# Patient Record
Sex: FEMALE | Race: WHITE | NOT HISPANIC OR LATINO | Employment: FULL TIME | URBAN - METROPOLITAN AREA
[De-identification: names, ages, dates, MRNs, and addresses within clinical notes are randomized per-mention and may not be internally consistent; named-entity substitution may affect disease eponyms.]

---

## 2018-12-30 ENCOUNTER — HISTORICAL (OUTPATIENT)
Dept: EMERGENCY MEDICINE | Facility: HOSPITAL | Age: 23
End: 2018-12-30

## 2018-12-30 LAB
APPEARANCE, UA: CLEAR
B-HCG SERPL QL: NEGATIVE
BILIRUB UR QL STRIP: NEGATIVE
COLOR UR: YELLOW
FLUAV AG NPH QL IA: NEGATIVE
FLUBV AG NPH QL IA: NEGATIVE
GLUCOSE (UA): NEGATIVE
HGB UR QL STRIP: NEGATIVE
KETONES UR QL STRIP: NEGATIVE
LEUKOCYTE ESTERASE UR QL STRIP: NEGATIVE
NITRITE UR QL STRIP: NEGATIVE
PH UR STRIP: 6.5 [PH] (ref 5–7)
PROT UR QL STRIP: NEGATIVE
SP GR UR STRIP: 1.02 (ref 1–1.03)
UROBILINOGEN UR STRIP-ACNC: NEGATIVE

## 2019-01-02 LAB — FINAL CULTURE: NORMAL

## 2019-02-05 ENCOUNTER — HISTORICAL (OUTPATIENT)
Dept: RADIOLOGY | Facility: HOSPITAL | Age: 24
End: 2019-02-05

## 2020-10-22 ENCOUNTER — HISTORICAL (OUTPATIENT)
Dept: LAB | Facility: HOSPITAL | Age: 25
End: 2020-10-22

## 2020-10-22 LAB
C DIFF INTERP: NEGATIVE
HEMOCCULT SP1 STL QL: NEGATIVE

## 2020-10-26 LAB — FINAL CULTURE: NORMAL

## 2020-11-08 ENCOUNTER — HOSPITAL ENCOUNTER (OUTPATIENT)
Dept: MEDSURG UNIT | Facility: HOSPITAL | Age: 25
End: 2020-11-10
Attending: SURGERY | Admitting: SURGERY

## 2020-11-08 LAB
ABS NEUT (OLG): 6.11 X10(3)/MCL (ref 2.1–9.2)
ALBUMIN SERPL-MCNC: 3.8 GM/DL (ref 3.5–5)
ALBUMIN/GLOB SERPL: 1 RATIO (ref 1.1–2)
ALP SERPL-CCNC: 79 UNIT/L (ref 40–150)
ALT SERPL-CCNC: 13 UNIT/L (ref 0–55)
APPEARANCE, UA: CLEAR
AST SERPL-CCNC: 16 UNIT/L (ref 5–34)
B-HCG SERPL QL: NEGATIVE
BASOPHILS # BLD AUTO: 0.05 X10(3)/MCL (ref 0–0.2)
BASOPHILS NFR BLD AUTO: 0.4 % (ref 0–1)
BILIRUB SERPL-MCNC: 0.2 MG/DL (ref 0.2–1.2)
BILIRUB UR QL STRIP: NEGATIVE
BILIRUBIN DIRECT+TOT PNL SERPL-MCNC: <0.1 MG/DL (ref 0–0.5)
BILIRUBIN DIRECT+TOT PNL SERPL-MCNC: >0.1 MG/DL (ref 0–0.8)
BUN SERPL-MCNC: 14 MG/DL (ref 7–18.7)
CALCIUM SERPL-MCNC: 9 MG/DL (ref 8.4–10.2)
CHLORIDE SERPL-SCNC: 105 MMOL/L (ref 98–107)
CO2 SERPL-SCNC: 28 MMOL/L (ref 22–29)
COLOR UR: NORMAL
CREAT SERPL-MCNC: 1.55 MG/DL (ref 0.57–1.11)
EOSINOPHIL # BLD AUTO: 0.26 X10(3)/MCL (ref 0–0.9)
EOSINOPHIL NFR BLD AUTO: 2.2 % (ref 0–6.4)
ERYTHROCYTE [DISTWIDTH] IN BLOOD BY AUTOMATED COUNT: 12.3 % (ref 11.5–17)
GLOBULIN SER-MCNC: 4 GM/DL (ref 2.4–3.5)
GLUCOSE (UA): NEGATIVE
GLUCOSE SERPL-MCNC: 98 MG/DL (ref 74–100)
HCT VFR BLD AUTO: 44 % (ref 37–47)
HGB BLD-MCNC: 13.9 GM/DL (ref 12–16)
HGB UR QL STRIP: NEGATIVE
IMM GRANULOCYTES # BLD AUTO: 0.02 10*3/UL (ref 0–0.02)
IMM GRANULOCYTES NFR BLD AUTO: 0.2 % (ref 0–0.43)
KETONES UR QL STRIP: NEGATIVE
LEUKOCYTE ESTERASE UR QL STRIP: NEGATIVE
LIPASE SERPL-CCNC: 32 U/L
LYMPHOCYTES # BLD AUTO: 4.63 X10(3)/MCL (ref 0.6–4.6)
LYMPHOCYTES NFR BLD AUTO: 39.9 % (ref 16–44)
MCH RBC QN AUTO: 28.5 PG (ref 27–31)
MCHC RBC AUTO-ENTMCNC: 31.6 GM/DL (ref 33–36)
MCV RBC AUTO: 90.3 FL (ref 80–94)
MONOCYTES # BLD AUTO: 0.53 X10(3)/MCL (ref 0.1–1.3)
MONOCYTES NFR BLD AUTO: 4.6 % (ref 4–12.1)
NEUTROPHILS # BLD AUTO: 6.11 X10(3)/MCL (ref 2.1–9.2)
NEUTROPHILS NFR BLD AUTO: 52.7 % (ref 43–73)
NITRITE UR QL STRIP: NEGATIVE
NRBC BLD AUTO-RTO: 0 % (ref 0–0.2)
PH UR STRIP: 6.5 [PH] (ref 5–7)
PLATELET # BLD AUTO: 276 X10(3)/MCL (ref 130–400)
PMV BLD AUTO: 10.2 FL (ref 7.4–10.4)
POTASSIUM SERPL-SCNC: 3.9 MMOL/L (ref 3.5–5.1)
PROT SERPL-MCNC: 7.8 GM/DL (ref 6.4–8.3)
PROT UR QL STRIP: NEGATIVE
RBC # BLD AUTO: 4.87 X10(6)/MCL (ref 4.2–5.4)
SODIUM SERPL-SCNC: 142 MMOL/L (ref 136–145)
SP GR UR STRIP: 1.02 (ref 1–1.03)
UROBILINOGEN UR STRIP-ACNC: NEGATIVE
WBC # SPEC AUTO: 11.6 X10(3)/MCL (ref 4.5–11.5)

## 2020-11-09 LAB — LACTATE SERPL-SCNC: 0.8 MMOL/L (ref 0.5–2.2)

## 2020-11-10 LAB
ABS NEUT (OLG): 10.7 X10(3)/MCL (ref 2.1–9.2)
ALBUMIN SERPL-MCNC: 3.6 GM/DL (ref 3.5–5)
ALBUMIN/GLOB SERPL: 0.8 RATIO (ref 1.1–2)
ALP SERPL-CCNC: 70 UNIT/L (ref 40–150)
ALT SERPL-CCNC: 14 UNIT/L (ref 0–55)
AST SERPL-CCNC: 19 UNIT/L (ref 5–34)
BASOPHILS # BLD AUTO: 0 X10(3)/MCL (ref 0–0.2)
BASOPHILS NFR BLD AUTO: 0 %
BILIRUB SERPL-MCNC: 0.2 MG/DL
BILIRUBIN DIRECT+TOT PNL SERPL-MCNC: -0.3 MG/DL (ref 0–0.8)
BILIRUBIN DIRECT+TOT PNL SERPL-MCNC: 0.5 MG/DL (ref 0–0.5)
BUN SERPL-MCNC: 13.5 MG/DL (ref 7–18.7)
CALCIUM SERPL-MCNC: 9.2 MG/DL (ref 8.4–10.2)
CHLORIDE SERPL-SCNC: 103 MMOL/L (ref 98–107)
CO2 SERPL-SCNC: 27 MMOL/L (ref 22–29)
CREAT SERPL-MCNC: 1.23 MG/DL (ref 0.55–1.02)
EOSINOPHIL # BLD AUTO: 0 X10(3)/MCL (ref 0–0.9)
EOSINOPHIL NFR BLD AUTO: 0 %
ERYTHROCYTE [DISTWIDTH] IN BLOOD BY AUTOMATED COUNT: 12.4 % (ref 11.5–17)
GLOBULIN SER-MCNC: 4.4 GM/DL (ref 2.4–3.5)
GLUCOSE SERPL-MCNC: 132 MG/DL (ref 74–100)
HCT VFR BLD AUTO: 43.1 % (ref 37–47)
HGB BLD-MCNC: 13.1 GM/DL (ref 12–16)
LYMPHOCYTES # BLD AUTO: 2.2 X10(3)/MCL (ref 0.6–4.6)
LYMPHOCYTES NFR BLD AUTO: 16 %
MCH RBC QN AUTO: 28.3 PG (ref 27–31)
MCHC RBC AUTO-ENTMCNC: 30.4 GM/DL (ref 33–36)
MCV RBC AUTO: 93.1 FL (ref 80–94)
MONOCYTES # BLD AUTO: 0.6 X10(3)/MCL (ref 0.1–1.3)
MONOCYTES NFR BLD AUTO: 4 %
NEUTROPHILS # BLD AUTO: 10.7 X10(3)/MCL (ref 2.1–9.2)
NEUTROPHILS NFR BLD AUTO: 79 %
PLATELET # BLD AUTO: 282 X10(3)/MCL (ref 130–400)
PMV BLD AUTO: 10.6 FL (ref 9.4–12.4)
POTASSIUM SERPL-SCNC: 4.6 MMOL/L (ref 3.5–5.1)
PROT SERPL-MCNC: 8 GM/DL (ref 6.4–8.3)
RBC # BLD AUTO: 4.63 X10(6)/MCL (ref 4.2–5.4)
SODIUM SERPL-SCNC: 140 MMOL/L (ref 136–145)
WBC # SPEC AUTO: 13.5 X10(3)/MCL (ref 4.5–11.5)

## 2020-11-14 LAB
FINAL CULTURE: NORMAL
FINAL CULTURE: NORMAL

## 2022-04-06 ENCOUNTER — HISTORICAL (OUTPATIENT)
Dept: ADMINISTRATIVE | Facility: HOSPITAL | Age: 27
End: 2022-04-06

## 2022-04-08 ENCOUNTER — PATIENT OUTREACH (OUTPATIENT)
Dept: EMERGENCY MEDICINE | Facility: HOSPITAL | Age: 27
End: 2022-04-08
Payer: MEDICAID

## 2022-04-10 ENCOUNTER — HISTORICAL (OUTPATIENT)
Dept: ADMINISTRATIVE | Facility: HOSPITAL | Age: 27
End: 2022-04-10
Payer: MEDICAID

## 2022-04-29 VITALS — WEIGHT: 179.25 LBS | DIASTOLIC BLOOD PRESSURE: 65 MMHG | OXYGEN SATURATION: 100 % | SYSTOLIC BLOOD PRESSURE: 97 MMHG

## 2022-04-30 NOTE — ED PROVIDER NOTES
Patient:   Susan Banerjee             MRN: [id number]            FIN: [id number]               Age:   23 years     Sex:  Female     :  1995   Associated Diagnoses:   Viral syndrome   Author:   Leslie KOO, Colin QUINONEZ      Basic Information   Time seen: Date & time 2018 20:46:00.   History source: Patient.   Arrival mode: Private vehicle.   History limitation: None.   Additional information: Chief Complaint from Nursing Triage Note : Chief Complaint   2018 20:38 CST     Chief Complaint           Sore throat cough,fever,chills chest tightness since yesterday. vomited  x 3 since yesterday  .      History of Present Illness   The patient presents with nasal congestion, pnd, cough, body aches, sore throat and states vomited 3 times in 2 days and her appetite is poor.  States subjective fever.  Both of her children have been sick..  The onset was 1  days ago.  The course/duration of symptoms is fluctuating in intensity.  Character dry.  The degree at onset was minimal.  The degree at present is moderate.  There are exacerbating factors including allergen exposure and climate change.  The relieving factor is none.  Risk factors consist of none.  Prior episodes: none.  Therapy today: none.  Associated symptoms: sore throat, rhinorrhea and nasal congestion.  Additional history: none.        Review of Systems   Constitutional symptoms:  Negative except as documented in HPI.   Skin symptoms:  Negative except as documented in HPI.   Eye symptoms:  Negative except as documented in HPI.   ENMT symptoms:  Negative except as documented in HPI.   Respiratory symptoms:  Negative except as documented in HPI.   Cardiovascular symptoms:  Negative except as documented in HPI.   Gastrointestinal symptoms:  Negative except as documented in HPI.   Genitourinary symptoms:  Negative except as documented in HPI.   Musculoskeletal symptoms:  Negative except as documented in HPI.   Neurologic symptoms:  Negative except as  documented in HPI.   Psychiatric symptoms:  Negative except as documented in HPI.   Endocrine symptoms:  Negative except as documented in HPI.   Hematologic/Lymphatic symptoms:  Negative except as documented in HPI.   Allergy/immunologic symptoms:  Negative except as documented in HPI.             Additional review of systems information: All other systems reviewed and otherwise negative.      Health Status   Allergies:    Allergies (1) Active Reaction  No Known Allergies None Documented, no known allergies.   Medications:  (Selected)   Prescriptions  Prescribed  ZyrTEC-D 5 mg-120 mg oral tablet, extended release: 1 tab(s), Oral, BID, # 60 tab(s), 0 Refill(s)  albuterol 2.5 mg/3 mL (0.083%) inhalation solution: 2.5 mg = 3 mL, INH, q6hr, PRN PRN for wheezing, # 360 mL, 0 Refill(s)  dicyclomine 20 mg oral tablet: 20 mg = 1 tab(s), Oral, QID, # 28 tab(s), 0 Refill(s)  Documented Medications  Documented  ACETAMINOPHEN/COD #3 (300/30MG) TAB:   ACETAMINOPHEN/COD #4 (300/60MG) TAB: 1 tab(s), Oral, QID  BUSPIRONE 10MG TABLETS: 10 mg = 1 tab(s), Oral, TID  LORATADINE 10 MG TABS: 10 mg = 1 tab(s), Oral, Daily  WALESKA/POLY/HC 1% OTIC SOL BLUE (EAR): Ear-Right, QID  Proventil HFA 90 mcg/inh inhalation aerosol with adapter: 2 puff(s), INH, QID, PRN PRN for wheezing, 0 Refill(s)  TIZANIDINE 4MG TABLETS: 4 mg = 1 tab(s), Oral, q8hr, per nurse's notes.   Menstrual history: Per nurse's notes.      Past Medical/ Family/ Social History   Medical history:    Active  Asthma ([id number])  ADHD (attention deficit hyperactivity disorder) ([id number]), Reviewed as documented in chart.   Surgical history:    PE tubes., Reviewed as documented in chart.   Family history: Not significant.   Social history: Alcohol use: Denies, Tobacco use: Denies, Drug use: Denies.      Physical Examination               Vital Signs   Vital Signs   12/30/2018 20:38 CST     Temperature Oral          37.1 DegC                              Temperature Oral (calculated)             98.78 DegF                             Peripheral Pulse Rate     120 bpm  HI                             Respiratory Rate          20 br/min                             SpO2                      98 %                             Oxygen Therapy            Room air                             Systolic Blood Pressure   124 mmHg                             Diastolic Blood Pressure  67 mmHg  .   Measurements   12/30/2018 20:38 CST     Weight Dosing             76 kg                             Weight Measured and Calculated in Lbs     167.55 lb                             Weight Estimated          76 kg                             Height/Length Dosing      152 cm                             Height/Length Estimated   152 cm                             Body Mass Index Estimated 32.89 kg/m2  .   Basic Oxygen Information   12/30/2018 20:38 CST     SpO2                      98 %                             Oxygen Therapy            Room air  .   General:  Alert, no acute distress.    Skin:  Warm, dry, intact.    Head:  Normocephalic, atraumatic.    Neck:  Supple, trachea midline, no tenderness.    Eye:  Pupils are equal, round and reactive to light.   Ears, nose, mouth and throat:  Tympanic membrane: Bilateral, normal, External ear: Bilateral, canal, mastoid, pinna, normal, Nose: Bilateral nares, moderate, congestion, discharge, Mouth: Bilateral, normal, Throat: Bilateral, mild, pharynx, erythema.    Cardiovascular:  Regular rate and rhythm, No murmur.    Respiratory:  Lungs are clear to auscultation, respirations are non-labored, breath sounds are equal.    Chest wall:  No tenderness.   Musculoskeletal:  Normal ROM, normal strength.    Gastrointestinal:  Soft, Nontender, Normal bowel sounds.    Genitourinary:  No tenderness.   Neurological:  Alert and oriented to person, place, time, and situation, normal speech observed.    Psychiatric:  Cooperative,  appropriate mood & affect.       Medical Decision Making   Differential Diagnosis::  Upper respiratory infection, influenza, sinusitis, pharyngitis.    Results review:  Lab results : Lab View   12/30/2018 21:20 CST     U Beta hCG Ql             Negative                             UA Appear                 CLEAR                             UA Color                  YELLOW                             UA Spec Grav              1.025                             UA Bili                   Negative                             UA pH                     6.5                             UA Urobilinogen           Negative                             UA Blood                  Negative                             UA Glucose                Negative                             UA Ketones                Negative                             UA Protein                Negative                             UA Nitrite                Negative                             UA Leuk Est               Negative    12/30/2018 20:52 CST     Influ A Ag                Negative                             Influ B Ag                Negative                             Rapid Strept              Negative    .      Reexamination/ Reevaluation   Time: 12/30/2018 23:23:00 .   Course: improving.      Impression and Plan   Diagnosis   Viral syndrome ([id number] B34.9)   Plan   Condition: Unchanged.    Disposition: Medically cleared, Discharged: Time  12/30/2018 23:24:00, to home.    Prescriptions: Launch prescriptions   Pharmacy:  Zithromax Z-Abdiaziz 250 mg oral tablet (Prescribe): = 1 packet(s), Oral, As Directed, per package labeling, X 5 day(s), # 6 tab(s), 0 Refill(s)  prednisONE 50 mg oral tablet (Prescribe): 50 mg = 1 tab(s), Oral, Daily, # 3 tab(s), 0 Refill(s)  albuterol 2.5 mg/3 mL (0.083%) inhalation solution (Prescribe): 2.5 mg = 3 mL, INH, q4hr, PRN PRN as needed for wheezing, # 25 EA, 0 Refill(s)  Ventolin HFA 90 mcg/inh inhalation aerosol  (Prescribe): 2 puff(s), INH, q6hr, PRN PRN as needed for wheezing/congestion, # 1 EA, 0 Refill(s)  Zofran ODT 4 mg oral tablet, disintegrating (Prescribe): 4 mg = 1 tab(s), Oral, q8hr, PRN PRN nausea, # 15 tab(s), 0 Refill(s)  .    Patient was given the following educational materials: Viral Illness, Adult.    Follow up with: Follow up with your family doctor of choice in the next 3-5 days; Report to ED if symptoms return / worsen.    Counseled: Patient, Regarding diagnosis, Regarding diagnostic results, Regarding treatment plan, Regarding prescription, Patient indicated understanding of instructions.
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
## 2022-04-30 NOTE — OP NOTE
DATE OF SURGERY:        SURGEON:  Yao Kaur Jr., MD    RESIDENT SURGEON:  Dr. Dhruv Francois, PGY-2    PREOPERATIVE DIAGNOSIS:  Acute appendicitis.    POSTOPERATIVE DIAGNOSIS:  Acute appendicitis.    PROCEDURE:  Laparoscopic appendectomy.    ANESTHESIA:  General endotracheal.    COMPLICATIONS:  None.    CONDITION:  Stable.    ESTIMATED BLOOD LOSS:  10 cc.    SPECIMEN:  Appendix.    FINDINGS:  Early acute appendicitis.    PROCEDURE IN DETAIL:  After appropriate consents were obtained, the patient was brought back to the operative suite, placed in supine position, placed under general endotracheal anesthesia.     Next, the abdomen was prepped and draped in the usual sterile fashion.  At this time, a time-out was done to make sure we had the appropriate patient, the appropriate operation, the appropriate preoperative medications.  Next, a supraumbilical incision was made with an 11 blade, and a 5 mm optical trocar was used to enter the abdomen.  Once into the abdomen, pneumoperitoneum was created.     Next, a left lower quadrant 12 mm optical port was placed under direct visualization with no complications.  Next, a suprapubic 5 mm port was placed under direct visualization with no complications.  We then isolated the right colon and followed the tinea down to the base of the appendix, and it was actively inflamed through the midportion of the appendix all the way to the tip.  At this point, we then grasped the appendix, made a window with the [place] dissector through the mesoappendix near the base of the appendix and the cecum.  Once this was done, we then came across the base of the appendix with the laparoscopic [place] stapler blue load 55 mm.  Then we came across the mesoappendix with a white load.  There was a small ooze of blood from the mesoappendix staple line.  This was stopped with a hemoclip placed right on the spot of small oozing, and this made the staple line hemostatic.  We then suctioned  out any fluid from the pelvis and suctioned out any blood around the cecum.       Next, we then placed the appendix in the laparoscopic EndoCatch bag and then removed the appendix out of the abdomen through the left lower quadrant incision and then passed it off as specimen.  Next, we then closed the left lower quadrant fascial incision with a laparoscopic suture passer with a 0 Vicryl and then closed all port sites with interrupted 4-0 Vicryl and Dermabond.       At case end, all counts were correct.  Patient tolerated procedure well, was ultimately transferred back to the floor in stable condition.        ______________________________  MD AZALEA Collazo Jr./  DD:  11/09/2020  Time:  10:59AM  DT:  11/09/2020  Time:  11:17AM  Job #:  [id number]

## 2022-05-09 ENCOUNTER — PATIENT OUTREACH (OUTPATIENT)
Dept: EMERGENCY MEDICINE | Facility: HOSPITAL | Age: 27
End: 2022-05-09
Payer: MEDICAID

## 2022-06-14 ENCOUNTER — OFFICE VISIT (OUTPATIENT)
Dept: GYNECOLOGY | Facility: CLINIC | Age: 27
End: 2022-06-14
Payer: MEDICAID

## 2022-06-14 VITALS
HEIGHT: 59 IN | WEIGHT: 168.81 LBS | HEART RATE: 77 BPM | DIASTOLIC BLOOD PRESSURE: 72 MMHG | OXYGEN SATURATION: 98 % | RESPIRATION RATE: 18 BRPM | BODY MASS INDEX: 34.03 KG/M2 | TEMPERATURE: 98 F | SYSTOLIC BLOOD PRESSURE: 109 MMHG

## 2022-06-14 DIAGNOSIS — Z30.431 INTRAUTERINE DEVICE SURVEILLANCE: ICD-10-CM

## 2022-06-14 DIAGNOSIS — Z01.419 ENCOUNTER FOR ANNUAL ROUTINE GYNECOLOGICAL EXAMINATION: Primary | ICD-10-CM

## 2022-06-14 DIAGNOSIS — N89.8 VAGINAL DISCHARGE: ICD-10-CM

## 2022-06-14 LAB
CLUE CELLS VAG QL WET PREP: ABNORMAL
T VAGINALIS VAG QL WET PREP: ABNORMAL
WBC #/AREA VAG WET PREP: ABNORMAL
YEAST SPEC QL WET PREP: ABNORMAL

## 2022-06-14 PROCEDURE — 99214 OFFICE O/P EST MOD 30 MIN: CPT | Mod: PBBFAC | Performed by: NURSE PRACTITIONER

## 2022-06-14 PROCEDURE — 1160F PR REVIEW ALL MEDS BY PRESCRIBER/CLIN PHARMACIST DOCUMENTED: ICD-10-PCS | Mod: CPTII,,, | Performed by: NURSE PRACTITIONER

## 2022-06-14 PROCEDURE — 3078F PR MOST RECENT DIASTOLIC BLOOD PRESSURE < 80 MM HG: ICD-10-PCS | Mod: CPTII,,, | Performed by: NURSE PRACTITIONER

## 2022-06-14 PROCEDURE — 99395 PREV VISIT EST AGE 18-39: CPT | Mod: S$PBB,,, | Performed by: NURSE PRACTITIONER

## 2022-06-14 PROCEDURE — 3008F PR BODY MASS INDEX (BMI) DOCUMENTED: ICD-10-PCS | Mod: CPTII,,, | Performed by: NURSE PRACTITIONER

## 2022-06-14 PROCEDURE — 1159F MED LIST DOCD IN RCRD: CPT | Mod: CPTII,,, | Performed by: NURSE PRACTITIONER

## 2022-06-14 PROCEDURE — 3074F PR MOST RECENT SYSTOLIC BLOOD PRESSURE < 130 MM HG: ICD-10-PCS | Mod: CPTII,,, | Performed by: NURSE PRACTITIONER

## 2022-06-14 PROCEDURE — 3078F DIAST BP <80 MM HG: CPT | Mod: CPTII,,, | Performed by: NURSE PRACTITIONER

## 2022-06-14 PROCEDURE — 87210 SMEAR WET MOUNT SALINE/INK: CPT | Performed by: NURSE PRACTITIONER

## 2022-06-14 PROCEDURE — 1160F RVW MEDS BY RX/DR IN RCRD: CPT | Mod: CPTII,,, | Performed by: NURSE PRACTITIONER

## 2022-06-14 PROCEDURE — 99395 PR PREVENTIVE VISIT,EST,18-39: ICD-10-PCS | Mod: S$PBB,,, | Performed by: NURSE PRACTITIONER

## 2022-06-14 PROCEDURE — 3008F BODY MASS INDEX DOCD: CPT | Mod: CPTII,,, | Performed by: NURSE PRACTITIONER

## 2022-06-14 PROCEDURE — 3074F SYST BP LT 130 MM HG: CPT | Mod: CPTII,,, | Performed by: NURSE PRACTITIONER

## 2022-06-14 PROCEDURE — 1159F PR MEDICATION LIST DOCUMENTED IN MEDICAL RECORD: ICD-10-PCS | Mod: CPTII,,, | Performed by: NURSE PRACTITIONER

## 2022-06-14 RX ORDER — ALBUTEROL SULFATE 90 UG/1
AEROSOL, METERED RESPIRATORY (INHALATION)
COMMUNITY
Start: 2022-01-13 | End: 2023-02-06

## 2022-06-14 RX ORDER — BUDESONIDE 0.25 MG/2ML
0.25 INHALANT ORAL DAILY
COMMUNITY
End: 2023-02-06

## 2022-06-14 RX ORDER — LEVONORGESTREL 52 MG/1
INTRAUTERINE DEVICE INTRAUTERINE
COMMUNITY
End: 2023-02-06

## 2022-06-14 NOTE — PROGRESS NOTES
"  Subjective:       Patient ID: Susan Banerjee is a 27 y.o. female.    Chief Complaint:  Well Woman    History of Present Illness  The patient  here for annual exam. Her LMP was 22. Period last 3-4 days and usually just spotting when wiping, cycles have restarted since 2022 she was previously amenorrheic. Mirena IUD placed in 2017 for contraception. At last visit, IUD strings was not visible. Pt was to have IUD removed in  per request with TL, however cancelled appt. Pt requesting to have IUD removed d/t pain and pressure with intercourse and at other times, not relieved with Tylenol as she was advised not to take Ibuprofen. Pt states partner can feel tip of IUD during intercourse. Denies history of abnormal paps. Last pap -NIL and HPV neg. Denies breast or urinary complaints. Denies abnormal bleeding or discharge. Pt reports no STIs in the past and no concerns. HPV vaccinated. Denies tobacco use. Dep. screening 0. Denies fly hx of breast, ovarian, uterine or colon cancer.     GYN & OB History  Patient's last menstrual period was 2022 (exact date).   Date of Last Pap: -NIL    Review of patient's allergies indicates:  No Known Allergies  History reviewed. No pertinent past medical history.  OB History    Para Term  AB Living   2 2           SAB IAB Ectopic Multiple Live Births                  # Outcome Date GA Lbr Jordan/2nd Weight Sex Delivery Anes PTL Lv   2 Para            1 Para                 Review of Systems  Review of Systems    Negative except for pertinent findings for positives per HPI     Objective:    Physical Exam    /72 (BP Location: Left arm, Patient Position: Sitting, BP Method: Medium (Automatic))   Pulse 77   Temp 98.2 °F (36.8 °C)   Resp 18   Ht 4' 11" (1.499 m)   Wt 76.6 kg (168 lb 12.8 oz)   LMP 2022 (Exact Date)   SpO2 98%   BMI 34.09 kg/m²   GENERAL: Well-developed female in no acute distress.  SKIN: Normal to " inspection, warm and intact.  BREASTS: No masses, lumps, discharge, tenderness.  ABDOMEN: Soft, non tender.  VULVA: General appearance normal; external genitalia with no lesions or erythema.  BLADDER: No tenderness.  VAGINA: Mucosa normal, pink, thin white discharge.  CERVIX: Grossly normal with erythema. Distal portion of IUD visible from os, pt experienced pain with swab and speculum exam.  BIMANUAL EXAM: reveals a 10 week-sized uterus. The uterus is regular and tender. Collins adnexa reveal no evidence of masses, tenderness.  PSYCHIATRIC: Patient is oriented to person, place, and time. Mood and affect are normal.    Assessment:       1. Encounter for annual routine gynecological examination    2. Intrauterine device surveillance    3. Vaginal discharge       Plan:   Susan was seen today for well woman.    Diagnoses and all orders for this visit:    Encounter for annual routine gynecological examination    Intrauterine device surveillance    Vaginal discharge  -     Wet Prep, Genital  -     Chlamydia/GC, PCR    Pelvic today. Pap utd per ACOG.  Distal portion of IUD visible from os, pt experiencing pain with speculum exam. Scheduled with GYN for removal on 6/15/22  Wet prep and g/c  Follow up in about 1 year (around 6/14/2023) for annual exam.

## 2022-06-15 ENCOUNTER — TELEPHONE (OUTPATIENT)
Dept: GYNECOLOGY | Facility: CLINIC | Age: 27
End: 2022-06-15
Payer: MEDICAID

## 2022-06-15 ENCOUNTER — OFFICE VISIT (OUTPATIENT)
Dept: GYNECOLOGY | Facility: CLINIC | Age: 27
End: 2022-06-15
Payer: MEDICAID

## 2022-06-15 ENCOUNTER — PATIENT OUTREACH (OUTPATIENT)
Dept: EMERGENCY MEDICINE | Facility: HOSPITAL | Age: 27
End: 2022-06-15
Payer: MEDICAID

## 2022-06-15 VITALS
BODY MASS INDEX: 33.87 KG/M2 | RESPIRATION RATE: 20 BRPM | OXYGEN SATURATION: 99 % | WEIGHT: 168 LBS | HEART RATE: 93 BPM | DIASTOLIC BLOOD PRESSURE: 83 MMHG | HEIGHT: 59 IN | TEMPERATURE: 98 F | SYSTOLIC BLOOD PRESSURE: 135 MMHG

## 2022-06-15 DIAGNOSIS — R10.2 CHRONIC PELVIC PAIN IN FEMALE: ICD-10-CM

## 2022-06-15 DIAGNOSIS — M79.18 MYALGIA OF PELVIC FLOOR: ICD-10-CM

## 2022-06-15 DIAGNOSIS — Z20.2 TRICHOMONAS EXPOSURE: Primary | ICD-10-CM

## 2022-06-15 DIAGNOSIS — Z30.432 ENCOUNTER FOR IUD REMOVAL: Primary | ICD-10-CM

## 2022-06-15 DIAGNOSIS — G89.29 CHRONIC PELVIC PAIN IN FEMALE: ICD-10-CM

## 2022-06-15 DIAGNOSIS — Z30.011 ENCOUNTER FOR INITIAL PRESCRIPTION OF CONTRACEPTIVE PILLS: ICD-10-CM

## 2022-06-15 PROCEDURE — 58301 REMOVE INTRAUTERINE DEVICE: CPT | Mod: PBBFAC | Performed by: STUDENT IN AN ORGANIZED HEALTH CARE EDUCATION/TRAINING PROGRAM

## 2022-06-15 PROCEDURE — 99214 OFFICE O/P EST MOD 30 MIN: CPT | Mod: PBBFAC

## 2022-06-15 RX ORDER — NORGESTIMATE AND ETHINYL ESTRADIOL 0.25-0.035
1 KIT ORAL DAILY
Qty: 28 TABLET | Refills: 12 | Status: SHIPPED | OUTPATIENT
Start: 2022-06-15 | End: 2023-02-06

## 2022-06-15 RX ORDER — METRONIDAZOLE 500 MG/1
500 TABLET ORAL 2 TIMES DAILY
Qty: 14 TABLET | Refills: 0 | Status: SHIPPED | OUTPATIENT
Start: 2022-06-15 | End: 2022-06-22

## 2022-06-15 NOTE — ASSESSMENT & PLAN NOTE
Contraception counseling on all options. Patient opts for COCPs. No medical contraindications. Has h/o migraines, but no aura. Rx for sprintec sent to pharmacy for contraception management.

## 2022-06-15 NOTE — TELEPHONE ENCOUNTER
Swab shows trichomonas which is an STD and needs to be treated. Other labs are pending. Rx sent to pharmacy on file. Pt and partner need treatment and refrain from intercourse for at least 7 days after they are both treated. If partner does not get treated and they engage in unprotected intercourse, will pass trichomonas on again. Encourage safe sex practices. No alcohol for 72 hours after completion of medication.

## 2022-06-15 NOTE — ASSESSMENT & PLAN NOTE
Worsening pelvic pain over the last few months with significant pain following speculum exam yesterday with NP. On exam bilateral tenderness on piriformis muscles. Referral for pelvic floor PT.

## 2022-06-15 NOTE — PROGRESS NOTES
Identity verified.  Pt denies SOB, urinary symptoms.  BM 6/15/22.  Pt had a GYN check up 6/14/22 and c/o pelvic pain with IUD.  Appt made for 6/15/22 to remove IUD.  STI testing completed and Flagyl prescription called to pharmacy today.  Pt states GYN clinic contacted her about the prescription.  Instructed pt to eat before taking medication and not to drink ETOH because it would make her violently ill.  Pt had GYN appt 6/15/22 and IUD was removed, birth control pills were ordered and referral to physical therapy for pelvic floor therapy.  Pt states she is waiting return call from GYN for a prescription for the pelvic pain.  Pt has not seen PCP since 2021.  Instructed to contact PCP to make an appt for a wellness exam.  Pt states she has not been to the dentist in the last year.  Offered to mail dental provider list, pt accepted.  Stressed the importance of medication compliance, keeping appointments and reinforced the use of urgent care clinic for non emergent issues when PCP unavailable.  Patient verbalized understanding to all instructions.   1109 Dental provider list and right care right time education mailed to pt.    Appointments   Follow-Up Appt Scheduled :   No   Follow-Up Appointment Status :     Pending Results :     PCP Visit Upcoming :   No   PCP Visit Within Year :   Yes   PCP Visit Upcoming Reason :   Regular visit   Follow-Up Specialist Appt Scheduled :   Yes   Type of Specialist :     GYN 6/16/2023  Follow-Up Lab Appt Scheduled :   No   Follow-Up Date :    CDT   Follow-Up Radiology Appt Scheduled :   No     Providers Patient Visited Last Year :     DOMINIK Posadas, FNP (pt. unsure of date but states it was in the last 6 months)  Dr. Daniel Barnard, ANP  Dr. Elissa Malave

## 2022-06-15 NOTE — PROGRESS NOTES
[facility] GYNECOLOGY CLINIC NOTE     Susan Banerjee is a 27 y.o.  presenting to GYN clinic for removal of malpositioned IUD. Patient reports worsening in bleeding and pain since March. Yesterday had annual exam with NP and was noted to see IUD post in cervical os. She reports she has had significant pelvic pain over the last few months not related to cramping. She feels like her pelvis has been tightening worse since speculum exam yesterday. IUD was placed in  for contraception and patient would like to be placed on COCPs for contraception.       Gynecology  Last Pap  NIL/HPV Neg    OB History        2    Para   2    Term                AB        Living           SAB        IAB        Ectopic        Multiple        Live Births                    Past Medical History:   Diagnosis Date    Complication of anesthesia     Migraine headache without aura       Past Surgical History:   Procedure Laterality Date    APPENDECTOMY      KNEE SURGERY        Current Outpatient Medications   Medication Instructions    albuterol (PROVENTIL/VENTOLIN HFA) 90 mcg/actuation inhaler ProAir HFA 90 mcg/actuation aerosol inhaler   Inhale 2 puff(s) every 4 hours by inhalation route as needed.    budesonide (PULMICORT) 0.25 mg, Nebulization, Daily, Controller    levonorgestreL (MIRENA) 20 mcg/24 hours (7 yrs) 52 mg IUD Mirena 20 mcg/24 hours (7 yrs) 52 mg intrauterine device   Take by intrauterine route.    metroNIDAZOLE (FLAGYL) 500 mg, Oral, 2 times daily, Do not drink alcohol during treatment and for 3 days after completion.    norgestimate-ethinyl estradioL (SPRINTEC, 28,) 0.25-35 mg-mcg per tablet 1 tablet, Oral, Daily     Social History     Tobacco Use    Smoking status: Never Smoker    Smokeless tobacco: Never Used   Substance Use Topics    Alcohol use: Never    Drug use: Never       Review of Systems  Pertinent items are noted in HPI.     Objective:     /83   Pulse 93   Temp 98  "°F (36.7 °C)   Resp 20   Ht 4' 11" (1.499 m)   Wt 76.2 kg (168 lb)   LMP 2022 (Exact Date)   SpO2 99%   BMI 33.93 kg/m²   Physical Exam:  Gen: Well-nourished, well-developed female appearing stated age. Alert, cooperative, in no acute distress.  External genitalia: Normal female genetalia without lesion, discharge or tenderness. I  Speculum Exam: Vaginal vault without discharge, nonodorous, no lesions/masses seen.  Cervical os visualized as closed, no lesions/masses. IUD post visible within cervical os  Bimanual Exam: No cervical motion tenderness.  Uterus freely mobile.  Normal size uterus. No adnexal masses.  Bilateral piriformis tenderness noted.   Note: RN chaperone present for entirety of exam.        Assessment:       27 y.o.  here for:   1. Encounter for IUD removal     2. Myalgia of pelvic floor  Ambulatory referral/consult to Physical/Occupational Therapy   3. Chronic pelvic pain in female     4. Encounter for initial prescription of contraceptive pills            Plan:         Problem List Items Addressed This Visit        Renal/    Encounter for IUD removal - Primary     IUD removed without difficulty.            Myalgia of pelvic floor     Worsening pelvic pain over the last few months with significant pain following speculum exam yesterday with NP. On exam bilateral tenderness on piriformis muscles. Referral for pelvic floor PT.            Relevant Orders    Ambulatory referral/consult to Physical/Occupational Therapy    Encounter for initial prescription of contraceptive pills     Contraception counseling on all options. Patient opts for COCPs. No medical contraindications. Has h/o migraines, but no aura. Rx for sprintec sent to pharmacy for contraception management.              GI    Chronic pelvic pain in female           Return to clinic for telemedicine in 6 mos to discuss pelvic pain    Discussed patient and plan with Dr. Provost Elissa Colon MD  [employer] [facility] PGY-4        "

## 2022-06-16 ENCOUNTER — TELEPHONE (OUTPATIENT)
Dept: GYNECOLOGY | Facility: CLINIC | Age: 27
End: 2022-06-16
Payer: MEDICAID

## 2022-06-16 ENCOUNTER — TELEPHONE (OUTPATIENT)
Dept: EMERGENCY MEDICINE | Facility: HOSPITAL | Age: 27
End: 2022-06-16
Payer: MEDICAID

## 2022-06-16 ENCOUNTER — PATIENT OUTREACH (OUTPATIENT)
Dept: EMERGENCY MEDICINE | Facility: HOSPITAL | Age: 27
End: 2022-06-16
Payer: MEDICAID

## 2022-06-16 NOTE — TELEPHONE ENCOUNTER
Patient called to state she is having a lot of abdominal pain and cramping.  She had an IUD removed 6/15/22.  She states she is unable to sleep or get comfortable.  Instructed to present to an urgent care clinic for evaluation.  Please follow up with the patient.    Thank you,     Eloise Casillas LPN Care Coordinator  [phone]

## 2022-06-16 NOTE — PROGRESS NOTES
Patient called to state she is having a lot of abdominal pain and cramping.  She had an IUD removed 6/15/22.  She states she is unable to sleep or get comfortable.  Instructed to present to an urgent care clinic for evaluation.  Message sent to [facility].

## 2022-06-16 NOTE — TELEPHONE ENCOUNTER
Patient c/o pelvic pain, had IUD removed yesterday, sending PT order today.  Instructed patient to rotate ibuprofen or tylenol or may take together.  Warm baths, heating pad may help after taking med.  [facility]

## 2022-07-22 ENCOUNTER — HOSPITAL ENCOUNTER (EMERGENCY)
Facility: HOSPITAL | Age: 27
Discharge: HOME | End: 2022-07-22
Attending: INTERNAL MEDICINE
Payer: MEDICAID

## 2022-07-22 VITALS
RESPIRATION RATE: 18 BRPM | SYSTOLIC BLOOD PRESSURE: 131 MMHG | DIASTOLIC BLOOD PRESSURE: 78 MMHG | BODY MASS INDEX: 32.79 KG/M2 | OXYGEN SATURATION: 98 % | TEMPERATURE: 98 F | WEIGHT: 167 LBS | HEART RATE: 81 BPM | HEIGHT: 60 IN

## 2022-07-22 DIAGNOSIS — R11.0 NAUSEA: ICD-10-CM

## 2022-07-22 DIAGNOSIS — F41.9 ANXIETY: ICD-10-CM

## 2022-07-22 DIAGNOSIS — R06.02 SHORTNESS OF BREATH: ICD-10-CM

## 2022-07-22 DIAGNOSIS — R10.13 EPIGASTRIC ABDOMINAL PAIN: Primary | ICD-10-CM

## 2022-07-22 LAB
ALBUMIN SERPL-MCNC: 3.3 GM/DL (ref 3.5–5)
ALBUMIN/GLOB SERPL: 1.1 RATIO (ref 1.1–2)
ALP SERPL-CCNC: 54 UNIT/L (ref 40–150)
ALT SERPL-CCNC: 16 UNIT/L (ref 0–55)
AMPHET UR QL SCN: NEGATIVE
AMYLASE SERPL-CCNC: 34 UNIT/L (ref 25–125)
APPEARANCE UR: ABNORMAL
AST SERPL-CCNC: 11 UNIT/L (ref 5–34)
B-HCG SERPL QL: NEGATIVE
BACTERIA #/AREA URNS AUTO: ABNORMAL /HPF
BARBITURATE SCN PRESENT UR: NEGATIVE
BASOPHILS # BLD AUTO: 0.04 X10(3)/MCL (ref 0–0.2)
BASOPHILS NFR BLD AUTO: 0.6 %
BENZODIAZ UR QL SCN: NEGATIVE
BILIRUB UR QL STRIP.AUTO: NEGATIVE MG/DL
BILIRUBIN DIRECT+TOT PNL SERPL-MCNC: 0.5 MG/DL
BUN SERPL-MCNC: 18 MG/DL (ref 7–18.7)
CALCIUM SERPL-MCNC: 8.8 MG/DL (ref 8.4–10.2)
CANNABINOIDS UR QL SCN: NEGATIVE
CHLORIDE SERPL-SCNC: 111 MMOL/L (ref 98–107)
CO2 SERPL-SCNC: 21 MMOL/L (ref 22–29)
COCAINE UR QL SCN: NEGATIVE
COLOR UR AUTO: YELLOW
CREAT SERPL-MCNC: 0.8 MG/DL (ref 0.55–1.02)
EOSINOPHIL # BLD AUTO: 0.22 X10(3)/MCL (ref 0–0.9)
EOSINOPHIL NFR BLD AUTO: 3.2 %
ERYTHROCYTE [DISTWIDTH] IN BLOOD BY AUTOMATED COUNT: 13.2 % (ref 11.5–17)
FENTANYL UR QL SCN: NEGATIVE
GLOBULIN SER-MCNC: 3.1 GM/DL (ref 2.4–3.5)
GLUCOSE SERPL-MCNC: 90 MG/DL (ref 74–100)
GLUCOSE UR QL STRIP.AUTO: NEGATIVE MG/DL
HCT VFR BLD AUTO: 38.8 % (ref 37–47)
HGB BLD-MCNC: 12.2 GM/DL (ref 12–16)
IMM GRANULOCYTES # BLD AUTO: 0.02 X10(3)/MCL (ref 0–0.04)
IMM GRANULOCYTES NFR BLD AUTO: 0.3 %
KETONES UR QL STRIP.AUTO: NEGATIVE MG/DL
LEUKOCYTE ESTERASE UR QL STRIP.AUTO: NEGATIVE UNIT/L
LIPASE SERPL-CCNC: 32 U/L
LYMPHOCYTES # BLD AUTO: 3.52 X10(3)/MCL (ref 0.6–4.6)
LYMPHOCYTES NFR BLD AUTO: 50.6 %
MCH RBC QN AUTO: 28.4 PG (ref 27–31)
MCHC RBC AUTO-ENTMCNC: 31.4 MG/DL (ref 33–36)
MCV RBC AUTO: 90.2 FL (ref 80–94)
MDMA UR QL SCN: NEGATIVE
MONOCYTES # BLD AUTO: 0.36 X10(3)/MCL (ref 0.1–1.3)
MONOCYTES NFR BLD AUTO: 5.2 %
NEUTROPHILS # BLD AUTO: 2.8 X10(3)/MCL (ref 2.1–9.2)
NEUTROPHILS NFR BLD AUTO: 40.1 %
NITRITE UR QL STRIP.AUTO: NEGATIVE
OPIATES UR QL SCN: NEGATIVE
PCP UR QL: NEGATIVE
PH UR STRIP.AUTO: 6 [PH]
PH UR: 6 [PH] (ref 3–11)
PLATELET # BLD AUTO: 194 X10(3)/MCL (ref 130–400)
PMV BLD AUTO: 10.7 FL (ref 7.4–10.4)
POTASSIUM SERPL-SCNC: 3.6 MMOL/L (ref 3.5–5.1)
PROT SERPL-MCNC: 6.4 GM/DL (ref 6.4–8.3)
PROT UR QL STRIP.AUTO: NEGATIVE MG/DL
RBC # BLD AUTO: 4.3 X10(6)/MCL (ref 4.2–5.4)
RBC #/AREA URNS AUTO: ABNORMAL /HPF
RBC UR QL AUTO: ABNORMAL UNIT/L
SODIUM SERPL-SCNC: 142 MMOL/L (ref 136–145)
SP GR UR STRIP.AUTO: 1.02
SPECIFIC GRAVITY, URINE AUTO (.000) (OHS): 1.02 (ref 1–1.03)
SQUAMOUS #/AREA URNS AUTO: ABNORMAL /HPF
UROBILINOGEN UR STRIP-ACNC: 0.2 MG/DL
WBC # SPEC AUTO: 7 X10(3)/MCL (ref 4.5–11.5)
WBC #/AREA URNS AUTO: ABNORMAL /HPF

## 2022-07-22 PROCEDURE — 85025 COMPLETE CBC W/AUTO DIFF WBC: CPT | Performed by: INTERNAL MEDICINE

## 2022-07-22 PROCEDURE — 81001 URINALYSIS AUTO W/SCOPE: CPT | Mod: 59 | Performed by: INTERNAL MEDICINE

## 2022-07-22 PROCEDURE — 63600175 PHARM REV CODE 636 W HCPCS: Performed by: INTERNAL MEDICINE

## 2022-07-22 PROCEDURE — 81025 URINE PREGNANCY TEST: CPT | Performed by: INTERNAL MEDICINE

## 2022-07-22 PROCEDURE — 96374 THER/PROPH/DIAG INJ IV PUSH: CPT

## 2022-07-22 PROCEDURE — 83690 ASSAY OF LIPASE: CPT | Performed by: INTERNAL MEDICINE

## 2022-07-22 PROCEDURE — 82150 ASSAY OF AMYLASE: CPT | Performed by: INTERNAL MEDICINE

## 2022-07-22 PROCEDURE — 96375 TX/PRO/DX INJ NEW DRUG ADDON: CPT

## 2022-07-22 PROCEDURE — 36415 COLL VENOUS BLD VENIPUNCTURE: CPT | Performed by: INTERNAL MEDICINE

## 2022-07-22 PROCEDURE — 99284 EMERGENCY DEPT VISIT MOD MDM: CPT | Mod: 25

## 2022-07-22 PROCEDURE — 80307 DRUG TEST PRSMV CHEM ANLYZR: CPT | Performed by: INTERNAL MEDICINE

## 2022-07-22 PROCEDURE — 25000003 PHARM REV CODE 250: Performed by: INTERNAL MEDICINE

## 2022-07-22 PROCEDURE — C9113 INJ PANTOPRAZOLE SODIUM, VIA: HCPCS | Performed by: INTERNAL MEDICINE

## 2022-07-22 PROCEDURE — 80053 COMPREHEN METABOLIC PANEL: CPT | Performed by: INTERNAL MEDICINE

## 2022-07-22 RX ORDER — PANTOPRAZOLE SODIUM 40 MG/10ML
40 INJECTION, POWDER, LYOPHILIZED, FOR SOLUTION INTRAVENOUS
Status: COMPLETED | OUTPATIENT
Start: 2022-07-22 | End: 2022-07-22

## 2022-07-22 RX ORDER — MAG HYDROX/ALUMINUM HYD/SIMETH 200-200-20
30 SUSPENSION, ORAL (FINAL DOSE FORM) ORAL ONCE
Status: COMPLETED | OUTPATIENT
Start: 2022-07-22 | End: 2022-07-22

## 2022-07-22 RX ORDER — PROCHLORPERAZINE EDISYLATE 5 MG/ML
5 INJECTION INTRAMUSCULAR; INTRAVENOUS
Status: COMPLETED | OUTPATIENT
Start: 2022-07-22 | End: 2022-07-22

## 2022-07-22 RX ORDER — LIDOCAINE HYDROCHLORIDE 20 MG/ML
10 SOLUTION OROPHARYNGEAL ONCE
Status: COMPLETED | OUTPATIENT
Start: 2022-07-22 | End: 2022-07-22

## 2022-07-22 RX ORDER — DICYCLOMINE HYDROCHLORIDE 20 MG/1
20 TABLET ORAL 4 TIMES DAILY PRN
Qty: 40 TABLET | Refills: 0 | Status: SHIPPED | OUTPATIENT
Start: 2022-07-22 | End: 2022-08-01

## 2022-07-22 RX ADMIN — PANTOPRAZOLE SODIUM 40 MG: 40 INJECTION, POWDER, LYOPHILIZED, FOR SOLUTION INTRAVENOUS at 07:07

## 2022-07-22 RX ADMIN — ALUMINUM HYDROXIDE, MAGNESIUM HYDROXIDE, AND SIMETHICONE 30 ML: 200; 200; 20 SUSPENSION ORAL at 07:07

## 2022-07-22 RX ADMIN — PROCHLORPERAZINE EDISYLATE 5 MG: 5 INJECTION INTRAMUSCULAR; INTRAVENOUS at 08:07

## 2022-07-22 RX ADMIN — LIDOCAINE HYDROCHLORIDE 10 ML: 20 SOLUTION ORAL at 07:07

## 2022-07-23 NOTE — ED PROVIDER NOTES
Encounter Date: 7/22/2022       History     Chief Complaint   Patient presents with    Abdominal Pain     Pt c/o epigastric pain since 3am. Pain worse with deep inspirations. Pt states currently taking medications for step throat and gastritis.     27-year-old white female presents to the emergency department with epigastric pain all day long.  She states that she feels like she can not take a deep breath because when she does it causes pain in her mid abdomen.  She was recently seen and was given medications for strep throat and gastritis by urgent care.  Then she was seen by primary given Carafate on top of that but still presents to the emergency department complaining of epigastric pain.  She denies tobacco        Review of patient's allergies indicates:  No Known Allergies  Past Medical History:   Diagnosis Date    Complication of anesthesia     Migraine headache without aura      Past Surgical History:   Procedure Laterality Date    APPENDECTOMY      KNEE SURGERY       Family History   Problem Relation Age of Onset    Lung cancer Mother      Social History     Tobacco Use    Smoking status: Never Smoker    Smokeless tobacco: Never Used   Substance Use Topics    Alcohol use: Never    Drug use: Never     Review of Systems   Constitutional: Negative.  Negative for activity change, appetite change, chills, diaphoresis, fatigue, fever and unexpected weight change.   HENT: Negative.  Negative for congestion, dental problem, drooling, ear discharge, ear pain, facial swelling, hearing loss, mouth sores, nosebleeds, postnasal drip, rhinorrhea, sinus pressure, sinus pain, sneezing, sore throat, tinnitus, trouble swallowing and voice change.    Eyes: Negative.  Negative for photophobia, pain, discharge, redness, itching and visual disturbance.   Respiratory: Negative.  Negative for apnea, cough, choking, chest tightness, shortness of breath, wheezing and stridor.    Cardiovascular: Negative.  Negative for chest  pain, palpitations and leg swelling.   Gastrointestinal: Positive for abdominal pain and nausea. Negative for abdominal distention, anal bleeding, blood in stool, constipation, diarrhea, rectal pain and vomiting.   Endocrine: Negative.  Negative for cold intolerance, heat intolerance, polydipsia, polyphagia and polyuria.   Genitourinary: Negative.  Negative for decreased urine volume, difficulty urinating, dyspareunia, dysuria, enuresis, flank pain, frequency, genital sores, hematuria, menstrual problem, pelvic pain, urgency, vaginal bleeding, vaginal discharge and vaginal pain.   Musculoskeletal: Negative.  Negative for arthralgias, back pain, gait problem, joint swelling, myalgias, neck pain and neck stiffness.   Skin: Negative.  Negative for color change, pallor, rash and wound.   Allergic/Immunologic: Negative.  Negative for environmental allergies, food allergies and immunocompromised state.   Neurological: Negative.  Negative for dizziness, tremors, seizures, syncope, facial asymmetry, speech difficulty, weakness, light-headedness, numbness and headaches.   Hematological: Negative.  Negative for adenopathy. Does not bruise/bleed easily.   Psychiatric/Behavioral: Negative for agitation, behavioral problems, confusion, decreased concentration, dysphoric mood, hallucinations, self-injury, sleep disturbance and suicidal ideas. The patient is nervous/anxious. The patient is not hyperactive.    All other systems reviewed and are negative.      Physical Exam     Initial Vitals   BP Pulse Resp Temp SpO2   07/22/22 1807 07/22/22 1804 07/22/22 1804 07/22/22 1804 07/22/22 1804   114/70 66 20 98.4 °F (36.9 °C) 100 %      MAP       --                Physical Exam    Nursing note and vitals reviewed.  Constitutional: She appears well-developed and well-nourished. She is not diaphoretic. No distress.   HENT:   Head: Normocephalic and atraumatic.   Mouth/Throat: Oropharynx is clear and moist. No oropharyngeal exudate.   Eyes:  Conjunctivae and EOM are normal. Pupils are equal, round, and reactive to light. No scleral icterus.   Neck: Neck supple. No JVD present.   Normal range of motion.  Cardiovascular: Normal rate, regular rhythm, normal heart sounds and intact distal pulses. Exam reveals no gallop and no friction rub.    No murmur heard.  Pulmonary/Chest: Breath sounds normal. No respiratory distress. She has no wheezes. She exhibits no tenderness.   Abdominal: Abdomen is soft. Bowel sounds are normal. She exhibits no distension. There is no abdominal tenderness.   Mild mid epigastric tenderness upon palpation no rebound no guarding no signs of acute abdomen There is no rebound.   Musculoskeletal:         General: Normal range of motion.      Cervical back: Normal range of motion and neck supple.     Neurological: She is alert and oriented to person, place, and time. She has normal strength and normal reflexes.   Skin: Skin is warm and dry. Capillary refill takes less than 2 seconds.   Psychiatric: She has a normal mood and affect. Thought content normal.   Anxious         ED Course   Procedures   Admission on 07/22/2022   Component Date Value Ref Range Status    Sodium Level 07/22/2022 142  136 - 145 mmol/L Final    Potassium Level 07/22/2022 3.6  3.5 - 5.1 mmol/L Final    Chloride 07/22/2022 111 (A) 98 - 107 mmol/L Final    Carbon Dioxide 07/22/2022 21 (A) 22 - 29 mmol/L Final    Glucose Level 07/22/2022 90  74 - 100 mg/dL Final    Blood Urea Nitrogen 07/22/2022 18.0  7.0 - 18.7 mg/dL Final    Creatinine 07/22/2022 0.80  0.55 - 1.02 mg/dL Final    Calcium Level Total 07/22/2022 8.8  8.4 - 10.2 mg/dL Final    Protein Total 07/22/2022 6.4  6.4 - 8.3 gm/dL Final    Albumin Level 07/22/2022 3.3 (A) 3.5 - 5.0 gm/dL Final    Globulin 07/22/2022 3.1  2.4 - 3.5 gm/dL Final    Albumin/Globulin Ratio 07/22/2022 1.1  1.1 - 2.0 ratio Final    Bilirubin Total 07/22/2022 0.5  <=1.5 mg/dL Final    Alkaline Phosphatase 07/22/2022 54   40 - 150 unit/L Final    Alanine Aminotransferase 07/22/2022 16  0 - 55 unit/L Final    Aspartate Aminotransferase 07/22/2022 11  5 - 34 unit/L Final    Estimated GFR-Non  07/22/2022 >60  mls/min/1.73/m2 Final    Beta hCG Qualitative, Urine 07/22/2022 Negative  Negative Final    Color, UA 07/22/2022 Yellow  Yellow, Colorless, Other, Clear Final    Appearance, UA 07/22/2022 Slightly Cloudy (A) Clear Final    Specific Gravity, UA 07/22/2022 1.020   Final    pH, UA 07/22/2022 6.0  5.0, 5.5, 6.0, 6.5, 7.0, 7.5, 8.0, 8.5 Final    Protein, UA 07/22/2022 Negative  Negative, 300  mg/dL Final    Glucose, UA 07/22/2022 Negative  Negative, Normal mg/dL Final    Ketones, UA 07/22/2022 Negative  Negative, +1, +2, +3, +4, +5, >=160, >=80 mg/dL Final    Blood, UA 07/22/2022 Large (A) Negative unit/L Final    Bilirubin, UA 07/22/2022 Negative  Negative mg/dL Final    Urobilinogen, UA 07/22/2022 0.2  0.2, 1.0, Normal mg/dL Final    Nitrites, UA 07/22/2022 Negative  Negative Final    Leukocyte Esterase, UA 07/22/2022 Negative  Negative, 75  unit/L Final    Amylase Level 07/22/2022 34  25 - 125 unit/L Final    Lipase Level 07/22/2022 32  <=60 U/L Final    WBC 07/22/2022 7.0  4.5 - 11.5 x10(3)/mcL Final    RBC 07/22/2022 4.30  4.20 - 5.40 x10(6)/mcL Final    Hgb 07/22/2022 12.2  12.0 - 16.0 gm/dL Final    Hct 07/22/2022 38.8  37.0 - 47.0 % Final    MCV 07/22/2022 90.2  80.0 - 94.0 fL Final    MCH 07/22/2022 28.4  27.0 - 31.0 pg Final    MCHC 07/22/2022 31.4 (A) 33.0 - 36.0 mg/dL Final    RDW 07/22/2022 13.2  11.5 - 17.0 % Final    Platelet 07/22/2022 194  130 - 400 x10(3)/mcL Final    MPV 07/22/2022 10.7 (A) 7.4 - 10.4 fL Final    Neut % 07/22/2022 40.1  % Final    Lymph % 07/22/2022 50.6  % Final    Mono % 07/22/2022 5.2  % Final    Eos % 07/22/2022 3.2  % Final    Basophil % 07/22/2022 0.6  % Final    Lymph # 07/22/2022 3.52  0.6 - 4.6 x10(3)/mcL Final    Neut # 07/22/2022 2.8  2.1 -  9.2 x10(3)/mcL Final    Mono # 07/22/2022 0.36  0.1 - 1.3 x10(3)/mcL Final    Eos # 07/22/2022 0.22  0 - 0.9 x10(3)/mcL Final    Baso # 07/22/2022 0.04  0 - 0.2 x10(3)/mcL Final    IG# 07/22/2022 0.02  0 - 0.04 x10(3)/mcL Final    IG% 07/22/2022 0.3  % Final    Amphetamines, Urine 07/22/2022 Negative  Negative Final    Barbituates, Urine 07/22/2022 Negative  Negative Final    Benzodiazepine, Urine 07/22/2022 Negative  Negative Final    Cannabinoids, Urine 07/22/2022 Negative  Negative Final    Cocaine, Urine 07/22/2022 Negative  Negative Final    Fentanyl, Urine 07/22/2022 Negative  Negative Final    MDMA, Urine 07/22/2022 Negative  Negative Final    Opiates, Urine 07/22/2022 Negative  Negative Final    Phencyclidine, Urine 07/22/2022 Negative  Negative Final    pH, Urine 07/22/2022 6.0  3.0 - 11.0 Final    Specific Gravity, Urine Auto 07/22/2022 1.020  1.001 - 1.035 Final    Bacteria, UA 07/22/2022 None Seen  None Seen, Rare, Occasional /HPF Final    RBC, UA 07/22/2022 50-99 (A) None Seen, 0-2, 3-5, 0-5 /HPF Final    WBC, UA 07/22/2022 0-2  None Seen, 0-2, 3-5, 0-5 /HPF Final    Squamous Epithelial Cells, UA 07/22/2022 Few (A) None Seen, Rare, Occasional, Occ /HPF Final   In regards to the red blood cells in her urine she admits to being on her menstrual cycle    Labs Reviewed   COMPREHENSIVE METABOLIC PANEL - Abnormal; Notable for the following components:       Result Value    Chloride 111 (*)     Carbon Dioxide 21 (*)     Albumin Level 3.3 (*)     All other components within normal limits   URINALYSIS, REFLEX TO URINE CULTURE - Abnormal; Notable for the following components:    Appearance, UA Slightly Cloudy (*)     Blood, UA Large (*)     All other components within normal limits   CBC WITH DIFFERENTIAL - Abnormal; Notable for the following components:    MCHC 31.4 (*)     MPV 10.7 (*)     All other components within normal limits   URINALYSIS, MICROSCOPIC - Abnormal; Notable for the  following components:    RBC, UA 50-99 (*)     Squamous Epithelial Cells, UA Few (*)     All other components within normal limits   PREGNANCY TEST, URINE RAPID - Normal   AMYLASE - Normal   LIPASE - Normal   DRUG SCREEN, URINE (BEAKER) - Normal    Narrative:     Cut off concentrations:    Amphetamines - 1000 ng/ml  Barbiturates - 200 ng/ml  Benzodiazepine - 200 ng/ml  Cannabinoids (THC) - 50 ng/ml  Cocaine - 300 ng/ml  Fentanyl - 1.0 ng/ml  MDMA - 500 ng/ml  Opiates - 300 ng/ml   Phencyclidine (PCP) - 25 ng/ml    Specimen submitted for drug analysis and tested for pH and specific gravity in order to evaluate sample integrity. Suspect tampering if specific gravity is <1.003 and/or pH is not within the range of 4.5 - 8.0  False negatives may result form substances such as bleach added to urine.  False positives may result for the presence of a substance with similar chemical structure to the drug or its metabolite.    This test provides only a PRELIMINARY analytical test result. A more specific alternate chemical method must be used in order to obtain a confirmed analytical result. Gas chromatography/mass spectrometry (GC/MS) is the preferred confirmatory method. Other chemical confirmation methods are available. Clinical consideration and professional judgement should be applied to any drug of abuse test result, particularly when preliminary positive results are used.    Positive results will be confirmed only at the physicians request. Unconfirmed screening results are to be used only for medical purposes (treatment).        CBC W/ AUTO DIFFERENTIAL    Narrative:     The following orders were created for panel order CBC auto differential.  Procedure                               Abnormality         Status                     ---------                               -----------         ------                     CBC with Differential[[id number]]        Abnormal            Final result                 Please view  results for these tests on the individual orders.          Imaging Results          X-Ray Chest PA And Lateral (Preliminary result)  Result time 07/22/22 21:33:05    ED Interpretation by Jacob Wharton MD (07/22/22 21:29:49, [facility] - Emergency Dept, Emergency Medicine)    No acute cardiopulmonary changes noted, no signs of free air under the diaphragm to indicate gastric perforation                               Medications   aluminum-magnesium hydroxide-simethicone 200-200-20 mg/5 mL suspension 30 mL (30 mLs Oral Given 7/22/22 1930)     And   LIDOcaine HCl 2% oral solution 10 mL (10 mLs Oral Given 7/22/22 1930)   pantoprazole injection 40 mg (40 mg Intravenous Given 7/22/22 1930)   prochlorperazine injection Soln 5 mg (5 mg Intravenous Given 7/22/22 2045)                 ED Course as of 07/22/22 2133 Fri Jul 22, 2022 2115 Patient did feel a little better relief after the GI cocktail but was still complaining of severe epigastric pain and diaphragm irritation so I decided to give her small dose of Compazine and tried a get some relaxation of her anxiety and to help treat some of her pain.  This worked perfect [PL]      ED Course User Index  [PL] Jacob Wharton MD             Clinical Impression:   Final diagnoses:  [R06.02] Shortness of breath  [R10.13] Epigastric abdominal pain (Primary)  [R11.0] Nausea  [F41.9] Anxiety          ED Disposition Condition    Discharge Stable        ED Prescriptions     Medication Sig Dispense Start Date End Date Auth. Provider    dicyclomine (BENTYL) 20 mg tablet Take 1 tablet (20 mg total) by mouth 4 (four) times daily as needed (Abdominal pain and cramping). 40 tablet 7/22/2022 8/1/2022 Jacob Wharton MD        Follow-up Information     Follow up With Specialties Details Why Contact Info    Dory Parsons NP Family Medicine In 1 week  PO   [place] [address]  [phone]          Karma Marmolejo is a certified MA and was present during the  entire interaction with this patient     Jacob Wharton MD  07/22/22 7239

## 2022-07-25 ENCOUNTER — PATIENT OUTREACH (OUTPATIENT)
Dept: EMERGENCY MEDICINE | Facility: HOSPITAL | Age: 27
End: 2022-07-25
Payer: MEDICAID

## 2022-07-25 NOTE — PROGRESS NOTES
Original encounter date 4/8/2022 in [employer] EMR.  Information placed in Epic for continuity purposes.  Initial Assessment                   [facility] Entered On:  4/8/2022 10:32 CDT    Performed On:  4/8/2022 10:23 CDT by Eloise Casillas               Discharge Past 30 Days   Visit to the Hospital in the Last 30 Days :   Emergency department (ED) visit   Reason for Choosing ED for Care :   Convenience   Perception of Change in Health Status DC :   Improving   Discharged To :   Home independently   DC Instructions :   Received discharge instructions , Understands discharge instructions    Education Provided :   ED utilization, Importance of keeping appointments, Medication Compliance, Other: Benefits of having a PCP and eating a healthy diet   Discharge Past 30 Days Addntl Comments :   Pt. states the left knee pain has improved and she has minimal swelling.  Instructed to complete medrol dose pack.  Pt. denies SOB.  Pt. states she needs a mouth piece for her nebulizer and is waiting PCP to provide prescription.  Pt. last pap smear 1/19/2021.  Pt. verbalized understanding to all instructions.     Patient pregnant :   No   Eloise Casillas - 4/8/2022 10:23 CDT   Barriers to Care   SDoH Eat Less Than You Should :   No   SDoH Shut Off Services to Your Home :   No   SDoH No Regular Place to Live :   No   SDoH Needed Provider but Costs too Much :   Yes   (Comment: Pt. sees pain management and has to pay out of pocket. [Eloise Casillas - 4/8/2022 10:23 CDT] )   SDoH Help Reading and Writing Paper Work :   No   SDoH Feel Lonely Often :   No   SDoH Missed Appt/Meds- No Transportation :   No   SDoH Call Dr To Be Seen Right Away :   No   SDoH Medical Problems Cause ED Visit :   Yes   (Comment: Chronic right knee pain [Eloise Casillas - 4/8/2022 10:23 CDT] )   SDoH Other Problems Affecting Health :   No   SDoH Reviewed :   Yes   SDoH Dentist Seen in Last Year :   Yes   Eloise Casillas - 4/8/2022 10:23 CDT   Prescriptions    Medication Reconcilation Completed :   Unknown   Medication Prescriptions Filled After DC :   Confirms all medications filled , Confirms taking medications as prescribed    Questions About Meds Prescribed at DC :   Denies any questions or concerns                    Eloise Casillas - 4/8/2022 10:23 CDT   Appointments   Follow-Up Appt Scheduled :   No   Follow-Up Appointment Status :   Has not had opportunity to call for appointment   Pending Results :   PCP office closed on Fridays   PCP Visit Upcoming :   No   PCP Visit Within Year :   Yes   PCP Visit Upcoming Reason :   Regular visit   Follow-Up Specialist Appt Scheduled :   Yes   Type of Specialist :   GYN   Follow-Up Lab Appt Scheduled :   No   Follow-Up Date :   6/4/2022 CDT   Follow-Up Radiology Appt Scheduled :   No   Eloise Casillas - 4/8/2022 10:23 CDT   Navigation   Initial Assesment Completed By :   Phone   ED FIN :   [id number]   Transportation Arrangements Made :   Yes   Providers Patient Visited Last Year :   DOMINIK Posadas, LUBA (pt. unsure of date but states it was in the last 6 months)  Dr. Daniel Pierre   Root Causes for High-ED Utilization :   Chronic conditions, Lack of access to care   Plan: :   Educated on appropriate ED utilization, Educated on alternate means of health care; ie urgent care when PCP not available, Educated on importance of follow up care with PCP, Educated on importance of follow up preventative dental care with dentist, Budget-friendly health eating education given, Other: Medication compliance   Participation in Activity Designed to Address Lack of Annual Ambulatory or Preventative Care Visit? :   Yes   Participation in Activity Designed to Address Avoidable ED Utilization? :   Yes   During Current Measurement Year, Did Enrollee Receive Education Regarding Outpatient Primary Care Options? :   Yes   During Current Measurement Year, Did Enrollee Receive an Appt Reminder 24-48 Hours Before a Scheduled Appt? :   Yes    During Current Measurement Year, Did the Network Provider Schedule and Appt or Provide a Referral to Enrollee? :   Yes   Education Provided :   Verbal   Eloise Casillas - 4/8/2022 10:23 CDT   Durable Medical Equipment   Do You Have Current DME at Home? :   Yes   Anticipated Home Equipment :   Nebulizer   DME Frequency of Use :   Daily   DME Duration of Use :   Life time   DME Supplies: :   Needs new supplies   DME Supplies Needed :   Mouth piece waiting for PCP to provide prescription   Eloise Casillas - 4/8/2022 10:23 CDT

## 2022-07-27 NOTE — PROGRESS NOTES
Attempted to reach patient 3 times for MCIP Navigation without success.  Encounter closed.    [id number] Patient returned call and encounter re-opened.    Identity verified.  Pt states she is feeling better.  She states she has no questions about prescriptions or discharge instructions from the ED.  Pt states she has not called PCP for an appt.  Instructed patient on the importance of having PCP visits to keep her well.  Pt denies SOB and states she has seen some improvement with the pelvic floor physical therapy.  Encouraged pt to eat a healthy diet and drink lots of water.  Pt denies jahaira SDOH barriers.  Stressed the importance of medication compliance, keeping appointments and reinforced the use of urgent care clinic for non emergent issues when PCP unavailable.  Patient verbalized understanding to all instructions.     Follow up 8/24/2022    Appointments   Follow-Up Appt Scheduled :   No   Follow-Up Appointment Status :   Has not had opportunity to call for appointment   Pending Results :     PCP Visit Upcoming :   No   PCP Visit Within Year :   Yes   PCP Visit Upcoming Reason :   Regular visit   Follow-Up Specialist Appt Scheduled :   Yes   Type of Specialist :    Physical Therapy 8/1/2022   GYN 6/16/2023  Follow-Up Lab Appt Scheduled :   No   Follow-Up Date :    CDT   Follow-Up Radiology Appt Scheduled :   No     Providers Patient Visited Last Year :     DOMINIK Posadas, MAYCOP (pt. unsure of date but states it was in the last 6 months)  Dr. Daniel Barnard, ANP  Dr. Elissa Morales  [facility]

## 2022-08-24 ENCOUNTER — PATIENT OUTREACH (OUTPATIENT)
Dept: EMERGENCY MEDICINE | Facility: HOSPITAL | Age: 27
End: 2022-08-24
Payer: MEDICAID

## 2022-08-24 NOTE — PROGRESS NOTES
"Identity verified.  Pt states she is doing well and denies SOB.  She states she had a PCP appt. 7/29/2022 with no changes in plan of care.  Pt states she has a follow up appt in a "few" months.  Pt states she has completed her pelvic floor physical therapy and is doing well.  Pt has not been to the dentist in the last year.  Stressed the importance of dental cleanings every 6 months.  Pt states she will call [facility] and make an appt.  Pt denies any SDOH barriers.  Stressed the importance of medication compliance, eating a healthy diet, drinking plenty of water, keeping appointments and reinforced the use of urgent care clinic for non emergent issues when PCP unavailable.  Patient verbalized understanding to all instructions.     Follow up 9/21/2022      Appointments   Follow-Up Appt Scheduled :   No   Follow-Up Appointment Status :     Pending Results :     PCP Visit Upcoming :   No   PCP Visit Upcoming Date  PCP Visit Upcoming Reason :     PCP Visit Within Year :   Yes   PCP Visit Within Year Date: 7/29/2022  PCP Visit Within Year Reason :   Regular visit   Follow-Up Specialist Appt Scheduled :   Yes   Type of Specialist :    GYN 6/16/2023  Follow-Up Lab Appt Scheduled :   No   Follow-Up Date :    CDT   Follow-Up Radiology Appt Scheduled :   No      Providers Patient Visited Last Year :     DOMINIK Posadas, FNP   Dr. Daniel Barnard, ANP  Dr. Elissa Morales  [facility]    "

## 2022-09-21 ENCOUNTER — PATIENT OUTREACH (OUTPATIENT)
Dept: EMERGENCY MEDICINE | Facility: HOSPITAL | Age: 27
End: 2022-09-21
Payer: MEDICAID

## 2022-12-02 ENCOUNTER — HOSPITAL ENCOUNTER (EMERGENCY)
Facility: HOSPITAL | Age: 27
Discharge: HOME | End: 2022-12-02
Attending: EMERGENCY MEDICINE
Payer: MEDICAID

## 2022-12-02 VITALS
SYSTOLIC BLOOD PRESSURE: 133 MMHG | BODY MASS INDEX: 30.24 KG/M2 | WEIGHT: 150 LBS | DIASTOLIC BLOOD PRESSURE: 83 MMHG | OXYGEN SATURATION: 99 % | HEIGHT: 59 IN | HEART RATE: 93 BPM | RESPIRATION RATE: 20 BRPM | TEMPERATURE: 98 F

## 2022-12-02 DIAGNOSIS — S83.91XA SPRAIN OF RIGHT KNEE, UNSPECIFIED LIGAMENT, INITIAL ENCOUNTER: ICD-10-CM

## 2022-12-02 DIAGNOSIS — S80.01XA CONTUSION OF RIGHT KNEE, INITIAL ENCOUNTER: Primary | ICD-10-CM

## 2022-12-02 DIAGNOSIS — M25.561 RIGHT KNEE PAIN: ICD-10-CM

## 2022-12-02 PROBLEM — J45.909 ASTHMA: Status: ACTIVE | Noted: 2022-12-02

## 2022-12-02 PROBLEM — F41.9 ANXIETY: Status: ACTIVE | Noted: 2019-09-16

## 2022-12-02 PROBLEM — F90.9 ATTENTION DEFICIT HYPERACTIVITY DISORDER (ADHD): Status: ACTIVE | Noted: 2022-12-02

## 2022-12-02 PROCEDURE — 96372 THER/PROPH/DIAG INJ SC/IM: CPT

## 2022-12-02 PROCEDURE — 63600175 PHARM REV CODE 636 W HCPCS

## 2022-12-02 PROCEDURE — 99284 EMERGENCY DEPT VISIT MOD MDM: CPT | Mod: 25

## 2022-12-02 RX ORDER — KETOROLAC TROMETHAMINE 30 MG/ML
60 INJECTION, SOLUTION INTRAMUSCULAR; INTRAVENOUS
Status: COMPLETED | OUTPATIENT
Start: 2022-12-02 | End: 2022-12-02

## 2022-12-02 RX ORDER — DICLOFENAC SODIUM 50 MG/1
50 TABLET, DELAYED RELEASE ORAL 2 TIMES DAILY PRN
Qty: 30 TABLET | Refills: 0 | Status: SHIPPED | OUTPATIENT
Start: 2022-12-02 | End: 2023-02-06

## 2022-12-02 RX ORDER — HYDROCODONE BITARTRATE AND ACETAMINOPHEN 5; 325 MG/1; MG/1
1 TABLET ORAL EVERY 6 HOURS PRN
Qty: 12 TABLET | Refills: 0 | Status: SHIPPED | OUTPATIENT
Start: 2022-12-02 | End: 2022-12-05

## 2022-12-02 RX ADMIN — KETOROLAC TROMETHAMINE 60 MG: 30 INJECTION, SOLUTION INTRAMUSCULAR at 08:12

## 2022-12-02 NOTE — Clinical Note
"Susan Bautista" Chriss was seen and treated in our emergency department on 12/2/2022.  She may return to work on 12/05/2022.  Released to light duty, ortho to release to full duty.      If you have any questions or concerns, please don't hesitate to call.      Jaleesa MEJIA    "

## 2022-12-03 NOTE — DISCHARGE INSTRUCTIONS
Thanks for letting us take care of you today!  It is our goal to give you courteous care and to keep you comfortable and informed, if you have any questions before you leave I will be happy to try and answer them.    Here is some advice after your visit:      Your visit in the emergency department is NOT definitive care - please follow-up with your primary care doctor and/or specialist within 1-2 days.  Please return if you have any worsening in your condition or if you have any other concerns.    If you had radiology exams like an XRAY or CT in the emergency Department the interpreation on them may be preliminary - there may be less time sensitive findings on the reports please obtain these reports within 24 hours from the hospital or by using your out on your mobile phone to access records.  Bring these to your primary care doctor and/or specialist for further review of incidental findings.    Please review any LAB WORK from your visit today with your primary care physician.    If you were prescribed OPIATE PAIN MEDICATION - please understand of these medications can be addictive, you may fill less of the prescription was written for, you do not have to take the full prescription.  You may discard what you do not use.  Please seek help if you feel you are having problems with addiction.  Do not drive or operate heavy machinery if you are taking sedating medications.  Do not mix these medications with alcohol.      If you had a SPLINT placed in the emergency department if you have severe pain numbness tingling or discoloration of year digits please remove the splint and return to the emergency department for further evaluation as this may represent a sign of compromise to the nerves or blood vessels due to swelling.

## 2022-12-03 NOTE — ED PROVIDER NOTES
Encounter Date: 12/2/2022       History     Chief Complaint   Patient presents with    Knee Pain     Patient is a 27 year old female who presents to ER with right knee pain after slipping on water and falling in a restaurant. She denies hitting head or neck. She denies numbness or tingling to extremities. Patient reports surgery to her right knee 3 years ago. She has not been able to ambulated on the leg since the fall. She is able to fully extend the leg but report pain when attempting to flex right knee.     The history is provided by the patient. No  was used.   Knee Pain  This is a new problem. The current episode started 1 to 2 hours ago. The problem has not changed since onset.Pertinent negatives include no chest pain, no abdominal pain, no headaches and no shortness of breath. The symptoms are aggravated by bending, exertion and walking. Nothing relieves the symptoms. She has tried rest for the symptoms. The treatment provided no relief.   Review of patient's allergies indicates:  No Known Allergies  Past Medical History:   Diagnosis Date    Complication of anesthesia     Migraine headache without aura      Past Surgical History:   Procedure Laterality Date    APPENDECTOMY      KNEE SURGERY       Family History   Problem Relation Age of Onset    Lung cancer Mother      Social History     Tobacco Use    Smoking status: Never    Smokeless tobacco: Never   Substance Use Topics    Alcohol use: Never    Drug use: Never     Review of Systems   Constitutional:  Negative for fever.   HENT:  Negative for sore throat.    Respiratory:  Negative for shortness of breath.    Cardiovascular:  Negative for chest pain.   Gastrointestinal:  Negative for abdominal pain and nausea.   Genitourinary:  Negative for dysuria.   Musculoskeletal:  Positive for arthralgias (Right knee pain) and joint swelling. Negative for back pain and neck pain.   Skin:  Negative for rash.   Neurological:  Negative for dizziness,  weakness and headaches.   Hematological:  Does not bruise/bleed easily.   Psychiatric/Behavioral:  Negative for behavioral problems.    All other systems reviewed and are negative.    Physical Exam     Initial Vitals   BP Pulse Resp Temp SpO2   12/02/22 1923 12/02/22 1923 12/02/22 1923 12/02/22 2000 12/02/22 1923   133/83 93 20 98.4 °F (36.9 °C) 99 %      MAP       --                Physical Exam    Nursing note and vitals reviewed.  Constitutional: Vital signs are normal. She appears well-developed and well-nourished.   HENT:   Head: Normocephalic.   Right Ear: Hearing and tympanic membrane normal.   Left Ear: Hearing and tympanic membrane normal.   Mouth/Throat: Uvula is midline, oropharynx is clear and moist and mucous membranes are normal.   Eyes: Conjunctivae are normal. Pupils are equal, round, and reactive to light.   Neck:   Normal range of motion.   Full passive range of motion without pain.     Cardiovascular:  Regular rhythm, normal heart sounds and normal pulses.           Pulmonary/Chest: Effort normal and breath sounds normal.   Musculoskeletal:      Cervical back: Normal, full passive range of motion without pain and normal range of motion. No muscular tenderness.      Thoracic back: Normal.      Lumbar back: Normal.      Right knee: Swelling and bony tenderness (Generalized) present. Decreased range of motion. ACL laxity (Mild) present. Normal pulse.      Left knee: Normal. Normal pulse.      Right lower leg: Normal.      Left lower leg: Normal.      Comments: All other adjacent joints normal      Neurological: She is alert. GCS eye subscore is 4. GCS verbal subscore is 5. GCS motor subscore is 6.   Skin: Skin is warm, dry and intact. Capillary refill takes less than 2 seconds.       ED Course   Procedures  Labs Reviewed - No data to display       Imaging Results              X-Ray Knee Complete 4 Or More Views Right (Final result)  Result time 12/02/22 20:08:35      Final result by Jose Batista,  MD (12/02/22 20:08:35)                   Impression:      No acute fractures are demonstrated.      Electronically signed by: Jose Batista MD  Date:    12/02/2022  Time:    20:08               Narrative:    EXAMINATION:  XR KNEE COMP 4 OR MORE VIEWS RIGHT    CLINICAL HISTORY:  Pain in right knee    COMPARISON:  None    FINDINGS:  There are no fractures seen.  There is no dislocation.  There are no bony lesions noted.                                       Medications   ketorolac injection 60 mg (60 mg Intramuscular Given 12/2/22 2015)     Medical Decision Making:   Initial Assessment:   Awake and alert, NAD.  Differential Diagnosis:   Knee contusion, knee fracture  Clinical Tests:   Radiological Study: Ordered and Reviewed  ED Management:  Patient is a 27 year old female who presents to ER with right knee pain after she slipped and fell in a restaurant. She has not been able to ambulate on the leg. She has some mild ACL laxity on exam. She has previous injury to the knee. XR is normal but will refer to ortho do to previous injury and decreased ROM, possible ligamentous injury. Discussed results with patient. She is amendable.                         Clinical Impression:   Final diagnoses:  [M25.561] Right knee pain  [S80.01XA] Contusion of right knee, initial encounter (Primary)  [S83.91XA] Sprain of right knee, unspecified ligament, initial encounter        ED Disposition Condition    Discharge Stable          ED Prescriptions       Medication Sig Dispense Start Date End Date Auth. Provider    diclofenac (VOLTAREN) 50 MG EC tablet Take 1 tablet (50 mg total) by mouth 2 (two) times daily as needed (Pain). 30 tablet 12/2/2022 -- Bryan Rosales NP    HYDROcodone-acetaminophen (NORCO) 5-325 mg per tablet Take 1 tablet by mouth every 6 (six) hours as needed for Pain. 12 tablet 12/2/2022 12/5/2022 Bryan Rosales NP          Follow-up Information       Follow up With Specialties Details Why Contact Info    Dory  SYLVAIN Parsons, NP Family Medicine Schedule an appointment as soon as possible for a visit  As needed, If symptoms worsen PO   [facility] [address]  [phone]               Bryan Rosales NP  12/02/22 2035

## 2023-01-31 ENCOUNTER — TELEPHONE (OUTPATIENT)
Dept: GYNECOLOGY | Facility: CLINIC | Age: 28
End: 2023-01-31
Payer: MEDICAID

## 2023-01-31 NOTE — TELEPHONE ENCOUNTER
----- Message from TAMMY Champagne sent at 1/31/2023  9:51 AM CST -----  Regarding: RE: Patient concern  GYN removed her IUD last year and prescribe OCPs. Please have them schedule with GYN.  ----- Message -----  From: Sandrita Pascual MT  Sent: 1/31/2023   7:33 AM CST  To: TAMMY Champagne  Subject: FW: Patient concern                              Please advise  ----- Message -----  From: Sasha Albarado  Sent: 1/30/2023  10:48 AM CST  To: [facility]il PATTON Staff  Subject: Patient concern                                  Patient called in stating that she is currently taking a birth control pill that was prescribed by Dr. Zhou, but she is unsure of the name of it. Patient stated that her cycle is extremely heavy on this medication, and she is having a lot of clotting. Patient would like to have an IUD placed instead. Patient would like a call back at [phone].       Scheduled telemed Feb 6th, canceled march appt.  States she is constantly saturating a tampon every 30 minutes and is fatigued, also traveling.  Instructed her that if she is saturating a tampon every 30 minutes constantly that she needs to have someone bring her to ED today.  Verbalizes understanding and will have boyfriend bring her.

## 2023-02-06 ENCOUNTER — CLINICAL SUPPORT (OUTPATIENT)
Dept: GYNECOLOGY | Facility: CLINIC | Age: 28
End: 2023-02-06
Payer: MEDICAID

## 2023-02-06 DIAGNOSIS — R10.2 PELVIC PAIN: Primary | ICD-10-CM

## 2023-02-06 DIAGNOSIS — N93.9 ABNORMAL UTERINE BLEEDING (AUB): ICD-10-CM

## 2023-02-06 RX ORDER — FLUTICASONE PROPIONATE AND SALMETEROL 250; 50 UG/1; UG/1
POWDER RESPIRATORY (INHALATION)
COMMUNITY

## 2023-02-06 RX ORDER — LORATADINE 10 MG/1
TABLET ORAL
COMMUNITY

## 2023-02-06 NOTE — PROGRESS NOTES
Audio Only Telehealth Visit     The patient location is: Home  The chief complaint leading to consultation is: AUB  Visit type: Virtual visit with audio only (telephone)  Total time spent with patient: 15 min     The reason for the audio only service rather than synchronous audio and video virtual visit was related to technical difficulties or patient preference/necessity.     Each patient to whom I provide medical services by telemedicine is:  (1) informed of the relationship between the physician and patient and the respective role of any other health care provider with respect to management of the patient; and (2) notified that they may decline to receive medical services by telemedicine and may withdraw from such care at any time. Patient verbally consented to receive this service via voice-only telephone call.       HPI:   26 yo  with history of AUB and pelvic floor myalgia. She was last seen in our clinic 2022 for removal of malpositioned IUD and was started on OCPs. Patient states she does not like the OCPs; reports her bleeding is much heavier with OCPs that previously with Mirena.     She reports dysmenorrhea during bleeding only. She is having regular monthly menses lasting 5-7 days, heavy with passage of clots. Denies feeling weak, lightheaded or dizzy. She stopped taking her OCPs on 23. She would like to have a Mirena IUD replaced.    Reports she went to pelvic floor PT for 4-5 weeks, stopped in 2022. Reports she still does her exercises daily and pelvic floor PT helped a lot. She is happy with resolution of her pain. She has no other complaints or concerns today.     Assessment and plan:    - Return for next available procedure visit for replacement of IUD  - Patient still sexually active, encouraged her to continue taking OCPs until IUD replaced to prevent pregnancy  - Encouraged her to call clinic with any questions or concerns.       This service was not originating from a related  E/M service provided within the previous 7 days nor will  to an E/M service or procedure within the next 24 hours or my soonest available appointment.  Prevailing standard of care was able to be met in this audio-only visit.      Discussed with Dr. Adhikari.     Irene Dominguez MD  [facility] PGY3

## 2023-02-08 ENCOUNTER — OFFICE VISIT (OUTPATIENT)
Dept: ORTHOPEDICS | Facility: CLINIC | Age: 28
End: 2023-02-08
Payer: MEDICAID

## 2023-02-08 VITALS
BODY MASS INDEX: 36.62 KG/M2 | DIASTOLIC BLOOD PRESSURE: 83 MMHG | HEART RATE: 82 BPM | HEIGHT: 59 IN | WEIGHT: 181.63 LBS | RESPIRATION RATE: 18 BRPM | SYSTOLIC BLOOD PRESSURE: 123 MMHG

## 2023-02-08 DIAGNOSIS — S83.401A SPRAIN OF COLLATERAL LIGAMENT OF RIGHT KNEE, INITIAL ENCOUNTER: ICD-10-CM

## 2023-02-08 DIAGNOSIS — M25.561 RIGHT KNEE PAIN: ICD-10-CM

## 2023-02-08 DIAGNOSIS — S80.01XA CONTUSION OF RIGHT KNEE, INITIAL ENCOUNTER: Primary | ICD-10-CM

## 2023-02-08 DIAGNOSIS — M76.31 IT BAND SYNDROME, RIGHT: ICD-10-CM

## 2023-02-08 PROCEDURE — 3079F DIAST BP 80-89 MM HG: CPT | Mod: CPTII,,, | Performed by: ORTHOPAEDIC SURGERY

## 2023-02-08 PROCEDURE — 20610 LARGE JOINT ASPIRATION/INJECTION: R KNEE: ICD-10-PCS | Mod: S$PBB,RT,, | Performed by: ORTHOPAEDIC SURGERY

## 2023-02-08 PROCEDURE — 1160F PR REVIEW ALL MEDS BY PRESCRIBER/CLIN PHARMACIST DOCUMENTED: ICD-10-PCS | Mod: CPTII,,, | Performed by: ORTHOPAEDIC SURGERY

## 2023-02-08 PROCEDURE — 3074F SYST BP LT 130 MM HG: CPT | Mod: CPTII,,, | Performed by: ORTHOPAEDIC SURGERY

## 2023-02-08 PROCEDURE — 20610 DRAIN/INJ JOINT/BURSA W/O US: CPT | Mod: PBBFAC | Performed by: ORTHOPAEDIC SURGERY

## 2023-02-08 PROCEDURE — 99203 OFFICE O/P NEW LOW 30 MIN: CPT | Mod: S$PBB,25,, | Performed by: ORTHOPAEDIC SURGERY

## 2023-02-08 PROCEDURE — 99213 OFFICE O/P EST LOW 20 MIN: CPT | Mod: PBBFAC

## 2023-02-08 PROCEDURE — 1159F PR MEDICATION LIST DOCUMENTED IN MEDICAL RECORD: ICD-10-PCS | Mod: CPTII,,, | Performed by: ORTHOPAEDIC SURGERY

## 2023-02-08 PROCEDURE — 3008F BODY MASS INDEX DOCD: CPT | Mod: CPTII,,, | Performed by: ORTHOPAEDIC SURGERY

## 2023-02-08 PROCEDURE — 3074F PR MOST RECENT SYSTOLIC BLOOD PRESSURE < 130 MM HG: ICD-10-PCS | Mod: CPTII,,, | Performed by: ORTHOPAEDIC SURGERY

## 2023-02-08 PROCEDURE — 1159F MED LIST DOCD IN RCRD: CPT | Mod: CPTII,,, | Performed by: ORTHOPAEDIC SURGERY

## 2023-02-08 PROCEDURE — 99203 PR OFFICE/OUTPT VISIT, NEW, LEVL III, 30-44 MIN: ICD-10-PCS | Mod: S$PBB,25,, | Performed by: ORTHOPAEDIC SURGERY

## 2023-02-08 PROCEDURE — 3079F PR MOST RECENT DIASTOLIC BLOOD PRESSURE 80-89 MM HG: ICD-10-PCS | Mod: CPTII,,, | Performed by: ORTHOPAEDIC SURGERY

## 2023-02-08 PROCEDURE — 1160F RVW MEDS BY RX/DR IN RCRD: CPT | Mod: CPTII,,, | Performed by: ORTHOPAEDIC SURGERY

## 2023-02-08 PROCEDURE — 3008F PR BODY MASS INDEX (BMI) DOCUMENTED: ICD-10-PCS | Mod: CPTII,,, | Performed by: ORTHOPAEDIC SURGERY

## 2023-02-08 RX ORDER — LIDOCAINE HYDROCHLORIDE 10 MG/ML
5 INJECTION, SOLUTION EPIDURAL; INFILTRATION; INTRACAUDAL; PERINEURAL
Status: COMPLETED | OUTPATIENT
Start: 2023-02-08 | End: 2023-02-08

## 2023-02-08 RX ORDER — TRIAMCINOLONE ACETONIDE 40 MG/ML
40 INJECTION, SUSPENSION INTRA-ARTICULAR; INTRAMUSCULAR
Status: COMPLETED | OUTPATIENT
Start: 2023-02-08 | End: 2023-02-08

## 2023-02-08 RX ADMIN — LIDOCAINE HYDROCHLORIDE 50 MG: 10 INJECTION, SOLUTION EPIDURAL; INFILTRATION; INTRACAUDAL; PERINEURAL at 03:02

## 2023-02-08 RX ADMIN — TRIAMCINOLONE ACETONIDE 40 MG: 40 INJECTION, SUSPENSION INTRA-ARTICULAR; INTRAMUSCULAR at 03:02

## 2023-02-08 NOTE — PROCEDURES
Large Joint Aspiration/Injection: R knee    Date/Time: 2/8/2023 12:50 PM  Performed by: Tyree Nino MD  Authorized by: Tyree Nino MD     Indications:  Pain  Approach:  Anterolateral  Location:  Knee  Site:  R knee

## 2023-02-08 NOTE — PROGRESS NOTES
"Subjective:    CC: Pain and Injury of the Right Knee       HPI:  Patient comes in today for her 1st visit.  Patient complains of right knee pain after twisting her knee over 2 months ago.  She is had a previous surgery on her knee in 2019 after a twisting event.  Patient states she had an IT band surgery around her knee.  She also had pain management for her right knee in the past.  She states she continues to ambulate, weight-bearing, she has been seen in the ER and placed in a knee immobilizer.  She has since converted to a hinged knee brace.  She denies any swelling she denies any gross instability.  She denies any numbness or tingling.  She continues to have discomfort around her knee.    ROS: Refer to HPI for pertinent ROS. All other 12 point systems negative.    Objective:  Vitals:    02/08/23 1321   BP: 123/83   BP Location: Right arm   Patient Position: Sitting   Pulse: 82   Resp: 18   Weight: 82.4 kg (181 lb 9.6 oz)   Height: 4' 11" (1.499 m)        Physical Exam:  Patient is well-nourished developed female she is awake alert and oriented x3 she is an apparent stress is pleasant and cooperative.  Examination of the right knee her previous lateral incision is well healed there is no swelling there is no erythema.  There is no joint effusion.  There is no warmth, she is tender both medially and laterally.  She has full, terminal extension.  She is able to flex to about 105 degrees today.  She is stable to varus and valgus stressing negative anterior posterior drawer negative Lachman's negative Aydee's.  She does walk with a slight hesitant gait, she is neurovascular intact distally.    Images:  X-rays three views right knee demonstrate no obvious fracture dislocation, outside x-rays from December. Images Reviewed and discussed with patient.    Assessment:  1. Right knee pain  - Ambulatory referral/consult to Orthopedics    2. Contusion of right knee, initial encounter    3. Sprain of collateral ligament of " right knee, initial encounter    4. It band syndrome, right        Plan:  At this time we discussed her physical exam and outside x-rays.  We have discussed various treatment options.  She will continue with her hinged knee brace as needed.  We have discussed formal therapy to regain some strength and motion.  She tolerated her intra-articular right knee injection today very well.  I would like see back in 6 weeks to see how she is progressing.  We have discussed an MRI she does not improve.    Follow UP: No follow-ups on file.

## 2023-03-08 ENCOUNTER — TELEPHONE (OUTPATIENT)
Dept: GYNECOLOGY | Facility: CLINIC | Age: 28
End: 2023-03-08
Payer: MEDICAID

## 2023-03-08 NOTE — TELEPHONE ENCOUNTER
----- Message from Dory Rogers sent at 3/7/2023  3:53 PM CST -----  Regarding: apt  Above pt is calling because she had an apt back on 2/6/23 via telemed and was suppose to be scheduled for a IUD removal and never was. She also states she hasn't had her cycle in since 2/26/23. Please Advise Thanks   Patient has appt in April, will continue to follow missed period with weekly pregnancy test, informed her that we will do one as well before inserting mirena, verbalizes understanding

## 2023-04-21 ENCOUNTER — PROCEDURE VISIT (OUTPATIENT)
Dept: GYNECOLOGY | Facility: CLINIC | Age: 28
End: 2023-04-21
Payer: MEDICAID

## 2023-04-21 VITALS
DIASTOLIC BLOOD PRESSURE: 76 MMHG | BODY MASS INDEX: 37.62 KG/M2 | RESPIRATION RATE: 18 BRPM | SYSTOLIC BLOOD PRESSURE: 109 MMHG | HEIGHT: 59 IN | WEIGHT: 186.63 LBS | OXYGEN SATURATION: 99 % | TEMPERATURE: 98 F | HEART RATE: 92 BPM

## 2023-04-21 DIAGNOSIS — Z30.432 ENCOUNTER FOR IUD REMOVAL: ICD-10-CM

## 2023-04-21 DIAGNOSIS — Z30.430 ENCOUNTER FOR IUD INSERTION: ICD-10-CM

## 2023-04-21 DIAGNOSIS — N93.9 ABNORMAL UTERINE BLEEDING (AUB): Primary | ICD-10-CM

## 2023-04-21 LAB
B-HCG UR QL: NEGATIVE
CTP QC/QA: YES

## 2023-04-21 PROCEDURE — 58300 INSERT INTRAUTERINE DEVICE: CPT | Mod: PBBFAC | Performed by: STUDENT IN AN ORGANIZED HEALTH CARE EDUCATION/TRAINING PROGRAM

## 2023-04-21 PROCEDURE — 81025 URINE PREGNANCY TEST: CPT | Mod: PBBFAC

## 2023-04-21 RX ORDER — ONDANSETRON 4 MG/1
4 TABLET, FILM COATED ORAL EVERY 8 HOURS PRN
COMMUNITY
Start: 2023-04-14

## 2023-04-21 RX ORDER — ALBUTEROL SULFATE 90 UG/1
2 AEROSOL, METERED RESPIRATORY (INHALATION) EVERY 4 HOURS PRN
COMMUNITY
Start: 2023-03-12

## 2023-04-21 RX ORDER — OFLOXACIN 3 MG/ML
SOLUTION AURICULAR (OTIC)
COMMUNITY
Start: 2023-03-26

## 2023-04-21 RX ORDER — OMEPRAZOLE 20 MG/1
20 CAPSULE, DELAYED RELEASE ORAL
COMMUNITY
Start: 2023-04-14

## 2023-04-21 RX ORDER — HYDROCODONE BITARTRATE AND ACETAMINOPHEN 5; 325 MG/1; MG/1
TABLET ORAL
COMMUNITY
Start: 2023-04-15

## 2023-04-21 RX ORDER — PROMETHAZINE HYDROCHLORIDE AND DEXTROMETHORPHAN HYDROBROMIDE 6.25; 15 MG/5ML; MG/5ML
5 SYRUP ORAL EVERY 6 HOURS PRN
COMMUNITY
Start: 2023-04-18

## 2023-04-21 RX ADMIN — LEVONORGESTREL 1 INTRA UTERINE DEVICE: 52 INTRAUTERINE DEVICE INTRAUTERINE at 08:04

## 2023-04-23 NOTE — PROCEDURES
Insertion of IUD    Date/Time: 4/21/2023 8:45 AM  Performed by: Verena Lomax MD  Authorized by: Pebbles Adhikari MD     Consent:     Consent obtained:  Verbal and written    Consent given by:  Patient    Procedure risks and benefits discussed: yes      Patient questions answered: yes      Patient agrees, verbalizes understanding, and wants to proceed: yes      Educational handouts given: yes      Instructions and paperwork completed: yes    Procedure:     Pelvic exam performed: yes      Negative urine pregnancy test: yes      Cervix cleaned and prepped: yes      Speculum placed in vagina: yes      Uterus sounded: yes      Uterus sound depth (cm):  7    IUD inserted with no complications: yes      Strings trimmed: yes    1 Intra Uterine Device levonorgestreL 21 mcg/24 hours (8 yrs) 52 mg     Post-procedure:     Patient tolerated procedure well: yes    Comments:      Patient presented with menorrhagia, resolved with Mirena IUD in the past. She desires replacement today and has been taking OCPs since her last visit as contraception prior to placement.     Lot: [id number]     Exp: 05/2025    Dr. Adhikari was present for the entirety of the procedure.    Verena Lomax MD  [facility], PGY-3

## 2023-06-24 ENCOUNTER — HOSPITAL ENCOUNTER (EMERGENCY)
Facility: HOSPITAL | Age: 28
Discharge: HOME | End: 2023-06-24
Attending: EMERGENCY MEDICINE
Payer: MEDICAID

## 2023-06-24 VITALS
TEMPERATURE: 98 F | HEIGHT: 59 IN | RESPIRATION RATE: 20 BRPM | HEART RATE: 100 BPM | OXYGEN SATURATION: 99 % | BODY MASS INDEX: 37.7 KG/M2 | WEIGHT: 187 LBS | SYSTOLIC BLOOD PRESSURE: 131 MMHG | DIASTOLIC BLOOD PRESSURE: 80 MMHG

## 2023-06-24 DIAGNOSIS — L02.91 ABSCESS: Primary | ICD-10-CM

## 2023-06-24 PROCEDURE — 25000003 PHARM REV CODE 250: Performed by: NURSE PRACTITIONER

## 2023-06-24 PROCEDURE — 10060 I&D ABSCESS SIMPLE/SINGLE: CPT

## 2023-06-24 PROCEDURE — 99284 EMERGENCY DEPT VISIT MOD MDM: CPT | Mod: 25

## 2023-06-24 RX ORDER — IBUPROFEN 600 MG/1
600 TABLET ORAL EVERY 8 HOURS PRN
Qty: 15 TABLET | Refills: 0 | Status: SHIPPED | OUTPATIENT
Start: 2023-06-24 | End: 2023-06-24 | Stop reason: SDUPTHER

## 2023-06-24 RX ORDER — IBUPROFEN 600 MG/1
600 TABLET ORAL EVERY 8 HOURS PRN
Qty: 15 TABLET | Refills: 0 | Status: SHIPPED | OUTPATIENT
Start: 2023-06-24

## 2023-06-24 RX ORDER — LIDOCAINE HYDROCHLORIDE 10 MG/ML
5 INJECTION INFILTRATION; PERINEURAL
Status: COMPLETED | OUTPATIENT
Start: 2023-06-24 | End: 2023-06-24

## 2023-06-24 RX ORDER — SULFAMETHOXAZOLE AND TRIMETHOPRIM 800; 160 MG/1; MG/1
1 TABLET ORAL 2 TIMES DAILY
Qty: 20 TABLET | Refills: 0 | Status: SHIPPED | OUTPATIENT
Start: 2023-06-24 | End: 2023-06-24 | Stop reason: SDUPTHER

## 2023-06-24 RX ORDER — SULFAMETHOXAZOLE AND TRIMETHOPRIM 800; 160 MG/1; MG/1
1 TABLET ORAL 2 TIMES DAILY
Qty: 20 TABLET | Refills: 0 | Status: SHIPPED | OUTPATIENT
Start: 2023-06-24 | End: 2023-07-04

## 2023-06-24 RX ADMIN — LIDOCAINE HYDROCHLORIDE 5 ML: 10 INJECTION, SOLUTION INFILTRATION; PERINEURAL at 05:06

## 2023-06-24 NOTE — ED PROVIDER NOTES
"Encounter Date: 6/24/2023       History     Chief Complaint   Patient presents with    Abscess     Pt c/o having an abscess develop to right shoulder after getting bitten by "something" two days ago.     Patient states area of redness and swelling to right upper chest wall x 2 days. States that she thinks that "something bit me." States this morning she had purulent drainage from the area. Denies any fever.     The history is provided by the patient and the spouse. No  was used.   Abscess   This is a new problem. The current episode started two days ago. The problem has been unchanged. Affected Location: Right upper chest wall. The pain is at a severity of 5/10. The abscess is characterized by redness, painfulness, swelling and draining. Pertinent negatives include no fever.   Review of patient's allergies indicates:  No Known Allergies  Past Medical History:   Diagnosis Date    Asthma     Complication of anesthesia     Migraine headache without aura      Past Surgical History:   Procedure Laterality Date    APPENDECTOMY      KNEE SURGERY       Family History   Problem Relation Age of Onset    Diabetes Maternal Grandfather     Lung cancer Mother      Social History     Tobacco Use    Smoking status: Never    Smokeless tobacco: Never   Substance Use Topics    Alcohol use: Yes    Drug use: Never     Review of Systems   Constitutional: Negative.  Negative for chills and fever.   HENT: Negative.     Eyes: Negative.    Respiratory: Negative.     Cardiovascular: Negative.    Gastrointestinal: Negative.    Endocrine: Negative.    Genitourinary: Negative.    Musculoskeletal: Negative.    Skin: Negative.         abcess   Allergic/Immunologic: Negative.    Neurological: Negative.    Hematological: Negative.    Psychiatric/Behavioral: Negative.     All other systems reviewed and are negative.    Physical Exam     Initial Vitals [06/24/23 1649]   BP Pulse Resp Temp SpO2   131/80 100 20 97.7 °F (36.5 °C) 99 % "      MAP       --         Physical Exam    Nursing note and vitals reviewed.  Constitutional: She appears well-developed and well-nourished. No distress.   HENT:   Head: Normocephalic and atraumatic.   Mouth/Throat: Oropharynx is clear and moist.   Eyes: Conjunctivae and EOM are normal. Pupils are equal, round, and reactive to light.   Neck: Neck supple.   Normal range of motion.  Cardiovascular:  Normal rate, regular rhythm, normal heart sounds and intact distal pulses.           Pulmonary/Chest: Breath sounds normal. No respiratory distress.   Musculoskeletal:         General: No tenderness or edema. Normal range of motion.      Cervical back: Normal range of motion and neck supple.     Neurological: She is alert and oriented to person, place, and time. She has normal strength.   Skin: Skin is warm and dry. Abscess (There is an approximately 1x1 cm abscess to right upper chest wall that is fluctuant and erythematous.) noted. No rash noted.        Psychiatric: She has a normal mood and affect. Thought content normal.       ED Course   I & D - Incision and Drainage    Date/Time: 6/24/2023 5:30 PM  Location procedure was performed: [facility] [place] EMERGENCY DEPARTMENT  Performed by: LUBA Garcia  Authorized by: LUBA Garcia   Consent Done: Yes  Consent: Verbal consent obtained.  Risks and benefits: risks, benefits and alternatives were discussed  Consent given by: patient  Patient understanding: patient states understanding of the procedure being performed  Type: abscess  Body area: trunk  Location details: chest  Anesthesia: local infiltration    Anesthesia:  Local Anesthetic: lidocaine 1% without epinephrine  Scalpel size: 11  Incision type: single straight  Complexity: simple  Drainage: purulent and bloody  Drainage amount: moderate  Wound treatment: incision, drainage, expression of material and wound packed  Packing material: 1/4 in iodoform gauze  Complications: No  Specimens: No  Implants: No  Patient  "tolerance: Patient tolerated the procedure well with no immediate complications      Labs Reviewed - No data to display       Imaging Results    None          Medications   LIDOcaine HCL 10 mg/ml (1%) injection 5 mL (5 mLs Infiltration Given 6/24/23 1730)     Medical Decision Making:   Initial Assessment:   Patient states area of redness and swelling to right upper chest wall x 2 days. States that she thinks that "something bit me." States this morning she had purulent drainage from the area. Denies any fever.     The history is provided by the patient and the spouse. No  was used.   Abscess   This is a new problem. The current episode started two days ago. The problem has been unchanged. Affected Location: Right upper chest wall. The pain is at a severity of 5/10. The abscess is characterized by redness, painfulness, swelling and draining. Pertinent negatives include no fever.   Patient is awake, alert, afebrile, and nontoxic appearing in the ED.   Differential Diagnosis:   Abscess, Cellulitis, Insect Bite, Skin Eruption  ED Management:  An I&D was performed to abscess of right upper chest (please see procedure note). Dressing was placed. Patient was given wound care instructions and instructed to remove packing in 48 hours. Patient was given ED return precautions.                         Clinical Impression:   Final diagnoses:  [L02.91] Abscess (Primary)        ED Disposition Condition    Discharge Stable          ED Prescriptions       Medication Sig Dispense Start Date End Date Auth. Provider    sulfamethoxazole-trimethoprim 800-160mg (BACTRIM DS) 800-160 mg Tab Take 1 tablet by mouth 2 (two) times daily. for 10 days 20 tablet 6/24/2023 7/4/2023 LUBA Garcia    ibuprofen (ADVIL,MOTRIN) 600 MG tablet Take 1 tablet (600 mg total) by mouth every 8 (eight) hours as needed for Pain. 15 tablet 6/24/2023 -- LUBA Garcia          Follow-up Information       Follow up With Specialties " Details Why Contact Info    Dory Parsons NP Family Medicine In 3 days  PO   [facility] [address]  [phone]               LUBA Garcia  06/24/23 7686

## 2023-06-24 NOTE — ED TRIAGE NOTES
"Pt c/o having an abscess develop to right shoulder after getting bitten by "something" two days ago.  "

## 2023-06-24 NOTE — Clinical Note
"Susan Garciadaryl Banerjee was seen and treated in our emergency department on 6/24/2023.  She may return to work on 06/26/2023.       If you have any questions or concerns, please don't hesitate to call.      LUBA Garcia"

## 2024-07-20 ENCOUNTER — HOSPITAL ENCOUNTER (EMERGENCY)
Facility: HOSPITAL | Age: 29
Discharge: HOME | End: 2024-07-20
Attending: STUDENT IN AN ORGANIZED HEALTH CARE EDUCATION/TRAINING PROGRAM
Payer: MEDICAID

## 2024-07-20 VITALS
OXYGEN SATURATION: 98 % | TEMPERATURE: 98 F | HEIGHT: 60 IN | BODY MASS INDEX: 36.52 KG/M2 | SYSTOLIC BLOOD PRESSURE: 127 MMHG | DIASTOLIC BLOOD PRESSURE: 69 MMHG | RESPIRATION RATE: 17 BRPM | HEART RATE: 97 BPM | WEIGHT: 186 LBS

## 2024-07-20 DIAGNOSIS — R10.10 UPPER ABDOMINAL PAIN: ICD-10-CM

## 2024-07-20 DIAGNOSIS — K29.70 GASTRITIS, PRESENCE OF BLEEDING UNSPECIFIED, UNSPECIFIED CHRONICITY, UNSPECIFIED GASTRITIS TYPE: Primary | ICD-10-CM

## 2024-07-20 DIAGNOSIS — K80.20 CALCULUS OF GALLBLADDER WITHOUT CHOLECYSTITIS WITHOUT OBSTRUCTION: ICD-10-CM

## 2024-07-20 LAB
ALBUMIN SERPL-MCNC: 4 G/DL (ref 3.5–5)
ALBUMIN/GLOB SERPL: 0.9 RATIO (ref 1.1–2)
ALP SERPL-CCNC: 86 UNIT/L (ref 40–150)
ALT SERPL-CCNC: 20 UNIT/L (ref 0–55)
ANION GAP SERPL CALC-SCNC: 10 MEQ/L
AST SERPL-CCNC: 22 UNIT/L (ref 5–34)
B-HCG UR QL: NEGATIVE
BACTERIA #/AREA URNS AUTO: ABNORMAL /HPF
BASOPHILS # BLD AUTO: 0.03 X10(3)/MCL
BASOPHILS NFR BLD AUTO: 0.2 %
BILIRUB SERPL-MCNC: 0.4 MG/DL
BILIRUB UR QL STRIP.AUTO: NEGATIVE
BUN SERPL-MCNC: 15.1 MG/DL (ref 7–18.7)
CALCIUM SERPL-MCNC: 9.9 MG/DL (ref 8.4–10.2)
CHLORIDE SERPL-SCNC: 104 MMOL/L (ref 98–107)
CLARITY UR: ABNORMAL
CO2 SERPL-SCNC: 24 MMOL/L (ref 22–29)
COLOR UR AUTO: ABNORMAL
CREAT SERPL-MCNC: 0.86 MG/DL (ref 0.55–1.02)
CREAT/UREA NIT SERPL: 18
EOSINOPHIL # BLD AUTO: 0.09 X10(3)/MCL (ref 0–0.9)
EOSINOPHIL NFR BLD AUTO: 0.6 %
ERYTHROCYTE [DISTWIDTH] IN BLOOD BY AUTOMATED COUNT: 12.6 % (ref 11.5–17)
FLUAV AG UPPER RESP QL IA.RAPID: NOT DETECTED
FLUBV AG UPPER RESP QL IA.RAPID: NOT DETECTED
GFR SERPLBLD CREATININE-BSD FMLA CKD-EPI: >60 ML/MIN/1.73/M2
GLOBULIN SER-MCNC: 4.3 GM/DL (ref 2.4–3.5)
GLUCOSE SERPL-MCNC: 107 MG/DL (ref 74–100)
GLUCOSE UR QL STRIP: NORMAL
HCT VFR BLD AUTO: 49.9 % (ref 37–47)
HGB BLD-MCNC: 15.9 G/DL (ref 12–16)
HGB UR QL STRIP: NEGATIVE
IMM GRANULOCYTES # BLD AUTO: 0.06 X10(3)/MCL (ref 0–0.04)
IMM GRANULOCYTES NFR BLD AUTO: 0.4 %
KETONES UR QL STRIP: NEGATIVE
LEUKOCYTE ESTERASE UR QL STRIP: 250
LIPASE SERPL-CCNC: 22 U/L
LYMPHOCYTES # BLD AUTO: 1.66 X10(3)/MCL (ref 0.6–4.6)
LYMPHOCYTES NFR BLD AUTO: 11.5 %
MCH RBC QN AUTO: 28.2 PG (ref 27–31)
MCHC RBC AUTO-ENTMCNC: 31.9 G/DL (ref 33–36)
MCV RBC AUTO: 88.5 FL (ref 80–94)
MONOCYTES # BLD AUTO: 0.39 X10(3)/MCL (ref 0.1–1.3)
MONOCYTES NFR BLD AUTO: 2.7 %
MUCOUS THREADS URNS QL MICRO: ABNORMAL /LPF
NEUTROPHILS # BLD AUTO: 12.26 X10(3)/MCL (ref 2.1–9.2)
NEUTROPHILS NFR BLD AUTO: 84.6 %
NITRITE UR QL STRIP: ABNORMAL
NRBC BLD AUTO-RTO: 0 %
PH UR STRIP: 6 [PH]
PLATELET # BLD AUTO: 245 X10(3)/MCL (ref 130–400)
PMV BLD AUTO: 9.9 FL (ref 7.4–10.4)
POTASSIUM SERPL-SCNC: 4.4 MMOL/L (ref 3.5–5.1)
PROT SERPL-MCNC: 8.3 GM/DL (ref 6.4–8.3)
PROT UR QL STRIP: NEGATIVE
RBC # BLD AUTO: 5.64 X10(6)/MCL (ref 4.2–5.4)
RBC #/AREA URNS AUTO: ABNORMAL /HPF
SARS-COV-2 RNA RESP QL NAA+PROBE: NOT DETECTED
SODIUM SERPL-SCNC: 138 MMOL/L (ref 136–145)
SP GR UR STRIP.AUTO: 1.02 (ref 1–1.03)
SQUAMOUS #/AREA URNS LPF: ABNORMAL /HPF
TROPONIN I SERPL-MCNC: <0.01 NG/ML (ref 0–0.04)
UROBILINOGEN UR STRIP-ACNC: NORMAL
WBC # BLD AUTO: 14.49 X10(3)/MCL (ref 4.5–11.5)
WBC #/AREA URNS AUTO: ABNORMAL /HPF

## 2024-07-20 PROCEDURE — 96365 THER/PROPH/DIAG IV INF INIT: CPT

## 2024-07-20 PROCEDURE — 83690 ASSAY OF LIPASE: CPT | Performed by: NURSE PRACTITIONER

## 2024-07-20 PROCEDURE — 96375 TX/PRO/DX INJ NEW DRUG ADDON: CPT

## 2024-07-20 PROCEDURE — 63600175 PHARM REV CODE 636 W HCPCS: Performed by: PHYSICIAN ASSISTANT

## 2024-07-20 PROCEDURE — 80053 COMPREHEN METABOLIC PANEL: CPT | Performed by: NURSE PRACTITIONER

## 2024-07-20 PROCEDURE — 81001 URINALYSIS AUTO W/SCOPE: CPT | Performed by: STUDENT IN AN ORGANIZED HEALTH CARE EDUCATION/TRAINING PROGRAM

## 2024-07-20 PROCEDURE — 84484 ASSAY OF TROPONIN QUANT: CPT | Performed by: NURSE PRACTITIONER

## 2024-07-20 PROCEDURE — 87077 CULTURE AEROBIC IDENTIFY: CPT | Performed by: STUDENT IN AN ORGANIZED HEALTH CARE EDUCATION/TRAINING PROGRAM

## 2024-07-20 PROCEDURE — 99284 EMERGENCY DEPT VISIT MOD MDM: CPT | Mod: ,,, | Performed by: SURGERY

## 2024-07-20 PROCEDURE — 96372 THER/PROPH/DIAG INJ SC/IM: CPT | Performed by: PHYSICIAN ASSISTANT

## 2024-07-20 PROCEDURE — 93010 ELECTROCARDIOGRAM REPORT: CPT | Mod: ,,, | Performed by: INTERNAL MEDICINE

## 2024-07-20 PROCEDURE — 0240U COVID/FLU A&B PCR: CPT | Performed by: PHYSICIAN ASSISTANT

## 2024-07-20 PROCEDURE — 25000003 PHARM REV CODE 250: Performed by: PHYSICIAN ASSISTANT

## 2024-07-20 PROCEDURE — 87186 SC STD MICRODIL/AGAR DIL: CPT | Performed by: STUDENT IN AN ORGANIZED HEALTH CARE EDUCATION/TRAINING PROGRAM

## 2024-07-20 PROCEDURE — 99285 EMERGENCY DEPT VISIT HI MDM: CPT | Mod: 25

## 2024-07-20 PROCEDURE — 93005 ELECTROCARDIOGRAM TRACING: CPT

## 2024-07-20 PROCEDURE — 81025 URINE PREGNANCY TEST: CPT | Performed by: PHYSICIAN ASSISTANT

## 2024-07-20 PROCEDURE — 85025 COMPLETE CBC W/AUTO DIFF WBC: CPT | Performed by: NURSE PRACTITIONER

## 2024-07-20 PROCEDURE — 96361 HYDRATE IV INFUSION ADD-ON: CPT

## 2024-07-20 RX ORDER — DICYCLOMINE HYDROCHLORIDE 20 MG/1
20 TABLET ORAL 4 TIMES DAILY
Qty: 28 TABLET | Refills: 0 | Status: SHIPPED | OUTPATIENT
Start: 2024-07-20 | End: 2024-07-27

## 2024-07-20 RX ORDER — CEFDINIR 300 MG/1
300 CAPSULE ORAL 2 TIMES DAILY
Qty: 20 CAPSULE | Refills: 0 | Status: SHIPPED | OUTPATIENT
Start: 2024-07-20 | End: 2024-07-30

## 2024-07-20 RX ORDER — DICYCLOMINE HYDROCHLORIDE 10 MG/ML
20 INJECTION INTRAMUSCULAR
Status: COMPLETED | OUTPATIENT
Start: 2024-07-20 | End: 2024-07-20

## 2024-07-20 RX ORDER — KETOROLAC TROMETHAMINE 30 MG/ML
30 INJECTION, SOLUTION INTRAMUSCULAR; INTRAVENOUS
Status: COMPLETED | OUTPATIENT
Start: 2024-07-20 | End: 2024-07-20

## 2024-07-20 RX ORDER — ONDANSETRON 4 MG/1
4 TABLET, ORALLY DISINTEGRATING ORAL EVERY 8 HOURS PRN
Qty: 20 TABLET | Refills: 0 | Status: SHIPPED | OUTPATIENT
Start: 2024-07-20

## 2024-07-20 RX ORDER — FAMOTIDINE 10 MG/ML
40 INJECTION INTRAVENOUS
Status: COMPLETED | OUTPATIENT
Start: 2024-07-20 | End: 2024-07-20

## 2024-07-20 RX ORDER — LIDOCAINE HYDROCHLORIDE 20 MG/ML
15 SOLUTION OROPHARYNGEAL
Status: COMPLETED | OUTPATIENT
Start: 2024-07-20 | End: 2024-07-20

## 2024-07-20 RX ORDER — ALUMINUM HYDROXIDE, MAGNESIUM HYDROXIDE, AND SIMETHICONE 1200; 120; 1200 MG/30ML; MG/30ML; MG/30ML
30 SUSPENSION ORAL
Status: COMPLETED | OUTPATIENT
Start: 2024-07-20 | End: 2024-07-20

## 2024-07-20 RX ADMIN — FAMOTIDINE 40 MG: 10 INJECTION, SOLUTION INTRAVENOUS at 05:07

## 2024-07-20 RX ADMIN — ALUMINUM HYDROXIDE, MAGNESIUM HYDROXIDE, AND SIMETHICONE 30 ML: 200; 200; 20 SUSPENSION ORAL at 07:07

## 2024-07-20 RX ADMIN — LIDOCAINE HYDROCHLORIDE 15 ML: 20 SOLUTION ORAL at 07:07

## 2024-07-20 RX ADMIN — SODIUM CHLORIDE, POTASSIUM CHLORIDE, SODIUM LACTATE AND CALCIUM CHLORIDE 1000 ML: 600; 310; 30; 20 INJECTION, SOLUTION INTRAVENOUS at 05:07

## 2024-07-20 RX ADMIN — DICYCLOMINE HYDROCHLORIDE 20 MG: 20 INJECTION, SOLUTION INTRAMUSCULAR at 05:07

## 2024-07-20 RX ADMIN — KETOROLAC TROMETHAMINE 30 MG: 30 INJECTION, SOLUTION INTRAMUSCULAR at 05:07

## 2024-07-20 RX ADMIN — CEFTRIAXONE SODIUM 1 G: 1 INJECTION, POWDER, FOR SOLUTION INTRAMUSCULAR; INTRAVENOUS at 05:07

## 2024-07-20 NOTE — DISCHARGE INSTRUCTIONS
Eat a bland diet over the next 2-3 days.  Use Zofran for nausea and vomiting.  Take full course of antibiotics.  Use Bentyl for abdominal pain.  Follow up with PCP in 7-10 days as needed.  Referral sent to GI and General surgery.

## 2024-07-20 NOTE — CONSULTS
Acute Care Surgery   Consult Note    Patient Name: Susan Banerjee  YOB: 1995  Date: 07/20/2024 6:12 PM  Date of Admission: 7/20/2024  HD#0  POD#* No surgery found *    PRESENTING HISTORY   Chief Complaint/Reason for Consult: Upper abdominal pain    History of Present Illness:  This is a 30 y/o F with a PMH  of gastritis on sucralfate and a PPI, headaches and asthma as well as chronic pain. She has a pertinent surgical history of a laparoscopic appendectomy. She presents to the emergency room with complaints of epigastric and RUQ abdominal pain which onset this morning when she woke up from sleep. Of note, she had not been able to take her sucralfate or PPI this morning. Denies any relation to eating. She has never had similar pain before. She does report a 2-3 day history of increased urinary frequency, fatigue, subjective fevers, chills, weakness and nausea. Reports a chronically decreased appetite given her gastritis. Did have a fried egg roll and a milkshake last night without any pain.    Review of Systems:  12 point ROS negative except as stated in HPI    PAST HISTORY:   Past medical history:  Past Medical History:   Diagnosis Date    Asthma     Complication of anesthesia     Migraine headache without aura        Past surgical history:  Past Surgical History:   Procedure Laterality Date    APPENDECTOMY      KNEE SURGERY         Family history:  Family History   Problem Relation Name Age of Onset    Diabetes Maternal Grandfather      Lung cancer Mother         Social history:  Social History     Socioeconomic History    Marital status: Single   Tobacco Use    Smoking status: Never    Smokeless tobacco: Never   Substance and Sexual Activity    Alcohol use: Yes    Drug use: Never    Sexual activity: Yes     Partners: Male     Social Determinants of Health     Food Insecurity: No Food Insecurity (6/15/2022)    Hunger Vital Sign     Worried About Running Out of Food in the Last Year:  Never true     Ran Out of Food in the Last Year: Never true   Transportation Needs: No Transportation Needs (6/15/2022)    PRAPARE - Transportation     Lack of Transportation (Medical): No     Lack of Transportation (Non-Medical): No   Housing Stability: Unknown (6/15/2022)    Housing Stability Vital Sign     Unable to Pay for Housing in the Last Year: No     Unstable Housing in the Last Year: No     Social History     Tobacco Use   Smoking Status Never   Smokeless Tobacco Never      Social History     Substance and Sexual Activity   Alcohol Use Yes        MEDICATIONS & ALLERGIES:     Current Facility-Administered Medications on File Prior to Encounter   Medication    levonorgestreL (MIRENA) 21 mcg/24 hours (8 yrs) 52 mg IUD 1 Intra Uterine Device     Current Outpatient Medications on File Prior to Encounter   Medication Sig    albuterol (PROVENTIL/VENTOLIN HFA) 90 mcg/actuation inhaler 2 puffs every 4 (four) hours as needed.    fluticasone-salmeterol diskus inhaler 250-50 mcg Advair Diskus 250 mcg-50 mcg/dose powder for inhalation   Inhale 1 puff twice a day by inhalation route for 30 days.    HYDROcodone-acetaminophen (NORCO) 5-325 mg per tablet SMARTSI Tablet(s) By Mouth 1-2 Times Daily    ibuprofen (ADVIL,MOTRIN) 600 MG tablet Take 1 tablet (600 mg total) by mouth every 8 (eight) hours as needed for Pain.    loratadine (CLARITIN) 10 mg tablet loratadine 10 mg tablet   Take 1 tablet every day by oral route for 30 days.    ofloxacin (FLOXIN) 0.3 % otic solution SMARTSIG:10 Drop(s) In Ear(s) Daily    omeprazole (PRILOSEC) 20 MG capsule Take 20 mg by mouth.    ondansetron (ZOFRAN) 4 MG tablet Take 4 mg by mouth every 8 (eight) hours as needed.    promethazine-dextromethorphan (PROMETHAZINE-DM) 6.25-15 mg/5 mL Syrp Take 5 mLs by mouth every 6 (six) hours as needed.       Allergies: Review of patient's allergies indicates:  No Known Allergies    Scheduled Meds:   cefTRIAXone (Rocephin) IV (PEDS and ADULTS)  1 g  "Intravenous ED 1 Time    levonorgestreL  1 Intra Uterine Device Intrauterine        Continuous Infusions:    PRN Meds:  Current Facility-Administered Medications:     levonorgestreL, 1 Intra Uterine Device, Intrauterine,     OBJECTIVE:   Vital Signs:  VITAL SIGNS: 24 HR MIN & MAX LAST   Temp  Min: 98 °F (36.7 °C)  Max: 98 °F (36.7 °C)  98 °F (36.7 °C)   BP  Min: 118/73  Max: 120/61  120/61    Pulse  Min: 105  Max: 112  105    Resp  Min: 17  Max: 17  17    SpO2  Min: 97 %  Max: 98 %  98 %      HT: 5' (152.4 cm)  WT: 84.4 kg (186 lb)  BMI: 36.3     No intake/output data recorded.  No intake/output data recorded.    Physical Exam:  General: Well developed, well nourished, no acute distress  HEENT: Normocephalic, atraumatic  CV: RR  Resp: NWOB  GI:  Abdomen soft, non-distended; mild epigastric and RUQ tenderness, negative pena sign  MSK: moving all extremities spontaneously  Skin/wounds:  No rashes, ulcers, erythema  Neuro: alert and oriented to person, place, and time    Labs:  Troponin:  Recent Labs     07/20/24  1541   TROPONINI <0.010     CBC:  Recent Labs     07/20/24  1541   WBC 14.49*   RBC 5.64*   HGB 15.9   HCT 49.9*      MCV 88.5   MCH 28.2   MCHC 31.9*     CMP:  Recent Labs     07/20/24  1541   CALCIUM 9.9   ALBUMIN 4.0      K 4.4   CO2 24      BUN 15.1   CREATININE 0.86   ALKPHOS 86   ALT 20   AST 22   BILITOT 0.4     Lactic Acid:  No results for input(s): "LACTATE" in the last 72 hours.  ETOH:  No results for input(s): "ETHANOL" in the last 72 hours.   Urine Drug Screen:  No results for input(s): "COCAINE", "OPIATE", "BARBITURATE", "AMPHETAMINE", "FENTANYL", "CANNABINOIDS", "MDMA" in the last 72 hours.    Invalid input(s): "BENZODIAZEPINE", "PHENCYCLIDINE"   ABG:  No results for input(s): "PH", "PCO2", "PO2", "HCO3", "BE", "POCSATURATED" in the last 72 hours.    Diagnostic Results:  US Abdomen Limited    Result Date: 7/20/2024  1.  Hepatic steatosis. 2.  Mild gallbladder sludge and " suspected small gallstones without ultrasound findings of acute cholecystitis. Electronically signed by: Arnaldo Dillon Date:    07/20/2024 Time:    16:51    US Abdomen Limited   Final Result      1.  Hepatic steatosis.      2.  Mild gallbladder sludge and suspected small gallstones without ultrasound findings of acute cholecystitis.         Electronically signed by: Arnaldo Dillon   Date:    07/20/2024   Time:    16:51          ASSESSMENT & PLAN:    This is a 30 y/o F presenting with abdominal pain, fever, chills, fatigue, increased urinary frequency. Physical exam with some mild RUQ and epigastric tenderness, negative pena sign. Lab work shows a UTI, WBC is 14. LFTs and bilirubin are normal. RUQ US shows some sludge but has a normal wall, CBD diameter and no pericholecystic fluid.  Neg pena sign of US as well.    -Pt without exam findings or workup concerning for acute cholecystitis. I suspect that her symptoms of nausea, fatigue, fever and chills are due to her UTI and I suspect that they will improve with treatment  -Her gastritis seems to be the most likely cause of her pain, she has not seen a GI in over 2 years so I recommend a referral to one so that she can re-establish care   -please refer patient to the [facility] general surgery clinic for further surgical follow up if her pain does not improve with treatment of her UTI and follow up with GI  -Patient okay to discharge from a surgical perspective, return precautions provided  -Please call with questions or concerns      Kelvin Manuel MD  [facility] General Surgery, PGY-4    7/20/2024 6:12 PM

## 2024-07-20 NOTE — ED PROVIDER NOTES
Encounter Date: 7/20/2024       History     Chief Complaint   Patient presents with    Abdominal Pain     RUQ pain, nausea, vomiting, and diarrhea. Hx of gastritis, asthma, and appendectomy. Pain worsens w/ intake. Denies Cp, sob, fever.      29-year-old female with a history of gastritis presents to ED for evaluation of epigastric pain with nausea vomiting and diarrhea worsening over the last 2-3 days.  Patient denies any fever.  Reports generalized weakness and fatigue.  States she took her Protonix and Carafate this morning without relief.    The history is provided by the patient. No  was used.     Review of patient's allergies indicates:  No Known Allergies  Past Medical History:   Diagnosis Date    Asthma     Complication of anesthesia     Migraine headache without aura      Past Surgical History:   Procedure Laterality Date    APPENDECTOMY      KNEE SURGERY       Family History   Problem Relation Name Age of Onset    Diabetes Maternal Grandfather      Lung cancer Mother       Social History     Tobacco Use    Smoking status: Never    Smokeless tobacco: Never   Substance Use Topics    Alcohol use: Yes    Drug use: Never     Review of Systems   Constitutional:  Negative for chills, fatigue and fever.   Respiratory:  Negative for shortness of breath.    Cardiovascular:  Negative for chest pain.   Gastrointestinal:  Positive for abdominal pain, diarrhea, nausea and vomiting.   Genitourinary:  Negative for dysuria, flank pain, frequency, pelvic pain and urgency.   Musculoskeletal:  Negative for myalgias.   All other systems reviewed and are negative.      Physical Exam     Initial Vitals [07/20/24 1456]   BP Pulse Resp Temp SpO2   118/73 (!) 112 17 98 °F (36.7 °C) 97 %      MAP       --         Physical Exam    Nursing note and vitals reviewed.  Constitutional: She appears well-developed and well-nourished.   HENT:   Head: Normocephalic and atraumatic.   Right Ear: Tympanic membrane and  external ear normal.   Left Ear: Tympanic membrane and external ear normal.   Mouth/Throat: Uvula is midline, oropharynx is clear and moist and mucous membranes are normal. No trismus in the jaw. No uvula swelling. No oropharyngeal exudate, posterior oropharyngeal edema or posterior oropharyngeal erythema.   Eyes: Conjunctivae are normal. Pupils are equal, round, and reactive to light.   Neck: Neck supple.   Normal range of motion.  Cardiovascular:  Normal rate, regular rhythm and normal heart sounds.           Pulmonary/Chest: Breath sounds normal. She has no wheezes. She has no rhonchi. She has no rales.   Abdominal: Abdomen is soft. Bowel sounds are normal. There is abdominal tenderness in the epigastric area. There is no rebound and no guarding.   Musculoskeletal:         General: Normal range of motion.      Cervical back: Normal range of motion and neck supple.     Neurological: She is alert and oriented to person, place, and time.   Skin: Skin is warm and dry.   Psychiatric: She has a normal mood and affect.         ED Course   Procedures  Labs Reviewed   COMPREHENSIVE METABOLIC PANEL - Abnormal       Result Value    Sodium 138      Potassium 4.4      Chloride 104      CO2 24      Glucose 107 (*)     Blood Urea Nitrogen 15.1      Creatinine 0.86      Calcium 9.9      Protein Total 8.3      Albumin 4.0      Globulin 4.3 (*)     Albumin/Globulin Ratio 0.9 (*)     Bilirubin Total 0.4      ALP 86      ALT 20      AST 22      eGFR >60      Anion Gap 10.0      BUN/Creatinine Ratio 18     CBC WITH DIFFERENTIAL - Abnormal    WBC 14.49 (*)     RBC 5.64 (*)     Hgb 15.9      Hct 49.9 (*)     MCV 88.5      MCH 28.2      MCHC 31.9 (*)     RDW 12.6      Platelet 245      MPV 9.9      Neut % 84.6      Lymph % 11.5      Mono % 2.7      Eos % 0.6      Basophil % 0.2      Lymph # 1.66      Neut # 12.26 (*)     Mono # 0.39      Eos # 0.09      Baso # 0.03      IG# 0.06 (*)     IG% 0.4      NRBC% 0.0     URINALYSIS, REFLEX TO  URINE CULTURE - Abnormal    Color, UA Light-Yellow      Appearance, UA Turbid (*)     Specific Gravity, UA 1.022      pH, UA 6.0      Protein, UA Negative      Glucose, UA Normal      Ketones, UA Negative      Blood, UA Negative      Bilirubin, UA Negative      Urobilinogen, UA Normal      Nitrites, UA 2+ (*)     Leukocyte Esterase,  (*)     RBC, UA 0-5      WBC, UA 21-50 (*)     Bacteria, UA Many (*)     Squamous Epithelial Cells, UA Trace      Mucous, UA Occasional (*)    TROPONIN I - Normal    Troponin-I <0.010     LIPASE - Normal    Lipase Level 22     COVID/FLU A&B PCR - Normal    Influenza A PCR Not Detected      Influenza B PCR Not Detected      SARS-CoV-2 PCR Not Detected      Narrative:     The Xpert Xpress SARS-CoV-2/FLU/RSV plus is a rapid, multiplexed real-time PCR test intended for the simultaneous qualitative detection and differentiation of SARS-CoV-2, Influenza A, Influenza B, and respiratory syncytial virus (RSV) viral RNA in either nasopharyngeal swab or nasal swab specimens.         PREGNANCY TEST, URINE RAPID - Normal    hCG Qualitative, Urine Negative     CULTURE, URINE   CBC W/ AUTO DIFFERENTIAL    Narrative:     The following orders were created for panel order CBC Auto Differential.  Procedure                               Abnormality         Status                     ---------                               -----------         ------                     CBC with Differential[[id number]]        Abnormal            Final result                 Please view results for these tests on the individual orders.     EKG Readings: (Independently Interpreted)   Initial Reading: No STEMI. Rhythm: Sinus Tachycardia. Heart Rate: 123. Ectopy: No Ectopy. Conduction: Normal. ST Segments: Normal ST Segments. T Waves: Normal. Clinical Impression: Sinus Tachycardia            Imaging Results              US Abdomen Limited (Final result)  Result time 07/20/24 16:51:21      Final result by Arnaldo Dillon,  MD (07/20/24 16:51:21)                   Impression:      1.  Hepatic steatosis.    2.  Mild gallbladder sludge and suspected small gallstones without ultrasound findings of acute cholecystitis.      Electronically signed by: Arnaldo Dillon  Date:    07/20/2024  Time:    16:51               Narrative:    EXAMINATION:  US ABDOMEN LIMITED    CLINICAL HISTORY:  Right upper quadrant abdominal pain.  Sharp pain.  Since this morning    TECHNIQUE:  Multiple real-time transverse and longitudinal sections were performed of the right abdomen by the sonographer. Select images were submitted for review.    FINDINGS:  Hepatic parenchyma is echogenic reflective of steatosis.  There is no delineation of discrete hepatic cystic or solid mass. Hepatic maximum diameter of 13.9 cm.  There is unremarkable hepatopedal flow within the portal vein.  Pancreatic tail is obscured.  Visualized portion of the pancreas is without dominant abnormality.    Gallbladder lumen contains sludge and are there also small echogenic foci which could represent some small gallstones.  Gallbladder wall thickness is within normal limits. Common bile duct caliber of 5.5 mm is within normal limits for the age. Sonographer reported negative Pascual's sign.    Normally located right kidney length measures 9.1 x 4.8 x 4.7 cm. Right renal corticomedullary differentiation is unremarkable. No evidence of hydronephrosis.                                       Medications   famotidine (PF) injection 40 mg (40 mg Intravenous Given 7/20/24 1704)   dicyclomine injection 20 mg (20 mg Intramuscular Given 7/20/24 1704)   lactated ringers bolus 1,000 mL (0 mLs Intravenous Stopped 7/20/24 1804)   ketorolac injection 30 mg (30 mg Intravenous Given 7/20/24 1739)   cefTRIAXone (Rocephin) 1 g in D5W 100 mL IVPB (MB+) (0 g Intravenous Stopped 7/20/24 1820)     Medical Decision Making  29-year-old female with a history of gastritis presents to ED for evaluation of epigastric pain with  nausea vomiting and diarrhea worsening over the last 2-3 days.  Patient denies any fever.  Reports generalized weakness and fatigue.  States she took her Protonix and Carafate this morning without relief.    Differential diagnosis includes but is not limited to gastritis, cholecystitis, peptic ulcer, UTI, pyelonephritis, kidney stones    Amount and/or Complexity of Data Reviewed  Labs: ordered. Decision-making details documented in ED Course.  Discussion of management or test interpretation with external provider(s): Patient presents to ED for evaluation of generalized fatigue with upper abdominal pain with nausea and vomiting over the last several days.  Patient has a history of gastritis but states that her medication was not working.  Labs obtained mild leukocytosis noted at 14.  Labs otherwise unremarkable.  UA positive for UTI with nitrites bacteria and leukocytes.  Given Rocephin IV.  Ultrasound abdomen obtained showing cholelithiasis without signs of acute cholecystitis.  Discussed case with surgery, Dr. Manuel, he had face-to-face encounter with the patient.  Recommends referral to [facility] for outpatient follow-up.  Discussed with the patient results and findings.  Discussed referral to GI will be placed.  Will treat symptomatically at this time.  Will give cefdinir to cover for possible pyelonephritis.  Return ED precautions given.  Patient verbalizes understanding.    Risk  OTC drugs.  Prescription drug management.               ED Course as of 07/20/24 1848   Sat Jul 20, 2024 1842 Influenza A, Molecular: Not Detected [SL]   1842 Influenza B, Molecular: Not Detected [SL]   1842 SARS-CoV2 (COVID-19) Qualitative PCR: Not Detected [SL]   1842 hCG Qualitative, Urine: Negative [SL]   1842 Troponin I: <0.010 [SL]   1842 Lipase: 22 [SL]   1842 NITRITE UA(!): 2+ [SL]   1842 Leukocyte Esterase, UA(!): 250 [SL]   1842 WBC, UA(!): 21-50 [SL]   1842 Bacteria, UA(!): Many [SL]   1842 WBC(!): 14.49 [SL]   1842 Hemoglobin:  15.9 [SL]   1842 Hematocrit(!): 49.9 [SL]   1842 US Abdomen Limited  Impression:     1.  Hepatic steatosis.     2.  Mild gallbladder sludge and suspected small gallstones without ultrasound findings of acute cholecystitis.   [SL]      ED Course User Index  [SL] Luda Jorge PA                           Clinical Impression:  Final diagnoses:  [R10.10] Upper abdominal pain  [K29.70] Gastritis, presence of bleeding unspecified, unspecified chronicity, unspecified gastritis type (Primary)  [K80.20] Calculus of gallbladder without cholecystitis without obstruction          ED Disposition Condition    Discharge Stable          ED Prescriptions       Medication Sig Dispense Start Date End Date Auth. Provider    dicyclomine (BENTYL) 20 mg tablet Take 1 tablet (20 mg total) by mouth 4 (four) times daily. for 7 days 28 tablet 7/20/2024 7/27/2024 Luda Jorge PA    ondansetron (ZOFRAN-ODT) 4 MG TbDL Take 1 tablet (4 mg total) by mouth every 8 (eight) hours as needed. 20 tablet 7/20/2024 -- Luda Jorge PA    cefdinir (OMNICEF) 300 MG capsule Take 1 capsule (300 mg total) by mouth 2 (two) times daily. for 10 days 20 capsule 7/20/2024 7/30/2024 Luda Jorge PA          Follow-up Information       Follow up With Specialties Details Why Contact Info    Dory Parsons NP Family Medicine   [address]   [facility] [address]  [phone]               Luda Jorge PA  07/20/24 2026

## 2024-07-20 NOTE — FIRST PROVIDER EVALUATION
Medical screening examination initiated.  I have conducted a focused provider triage encounter, findings are as follows:    Brief history of present illness:  28y/o F presents to the ED with RUQ abdominal pain with N/VD. Hx of gastritis . Reports no relief with medications at home.     There were no vitals filed for this visit.    Pertinent physical exam:  AAAx 3    Brief workup plan:  Labs/EKG    Preliminary workup initiated; this workup will be continued and followed by the physician or advanced practice provider that is assigned to the patient when roomed.

## 2024-07-21 LAB
OHS QRS DURATION: 84 MS
OHS QTC CALCULATION: 472 MS

## 2024-07-22 LAB — BACTERIA UR CULT: ABNORMAL

## 2024-07-30 ENCOUNTER — OFFICE VISIT (OUTPATIENT)
Dept: SURGERY | Facility: CLINIC | Age: 29
End: 2024-07-30
Payer: MEDICAID

## 2024-07-30 ENCOUNTER — ANESTHESIA EVENT (OUTPATIENT)
Dept: SURGERY | Facility: HOSPITAL | Age: 29
End: 2024-07-30
Payer: MEDICAID

## 2024-07-30 VITALS
DIASTOLIC BLOOD PRESSURE: 81 MMHG | TEMPERATURE: 99 F | WEIGHT: 187.63 LBS | SYSTOLIC BLOOD PRESSURE: 114 MMHG | HEART RATE: 85 BPM | RESPIRATION RATE: 20 BRPM | HEIGHT: 61 IN | OXYGEN SATURATION: 99 % | BODY MASS INDEX: 35.43 KG/M2

## 2024-07-30 DIAGNOSIS — K80.20 CALCULUS OF GALLBLADDER WITHOUT CHOLECYSTITIS WITHOUT OBSTRUCTION: ICD-10-CM

## 2024-07-30 PROCEDURE — 99215 OFFICE O/P EST HI 40 MIN: CPT | Mod: PBBFAC

## 2024-07-30 RX ORDER — CEFAZOLIN SODIUM 2 G/50ML
2 SOLUTION INTRAVENOUS
OUTPATIENT
Start: 2024-08-05 | End: 2024-08-05

## 2024-07-30 RX ORDER — PANTOPRAZOLE SODIUM 40 MG/1
40 TABLET, DELAYED RELEASE ORAL DAILY
COMMUNITY

## 2024-07-30 RX ORDER — HEPARIN SODIUM 5000 [USP'U]/ML
5000 INJECTION, SOLUTION INTRAVENOUS; SUBCUTANEOUS
OUTPATIENT
Start: 2024-08-05 | End: 2024-08-05

## 2024-07-30 RX ORDER — SUCRALFATE 1 G/1
1 TABLET ORAL 4 TIMES DAILY
COMMUNITY

## 2024-07-30 RX ORDER — SODIUM CHLORIDE 9 MG/ML
INJECTION, SOLUTION INTRAVENOUS CONTINUOUS
OUTPATIENT
Start: 2024-07-30

## 2024-07-30 NOTE — PROGRESS NOTES
[facility]   Clinic Note       CC: cholelithiasis       HPI: Susan Banerjee is a 29 y.o. female with PMHx of asthma, gastritis, appendectomy, and migraines presents to the general surgery clinic today for evaluation of cholelithiasis and possible surgery. Patient was recently seen in the ED on  for RUQ pain and n/v/d. US on that date revealed hepatic steatosis and gallbladder sludge with small stones without acute cholecystitis. Today, she endorses stabbing intermittent RUQ rated 9/10 that is worse after meals and with moving. She reports this is a different pain from her gastritis/gastric reflux pain for which she takes protonix and sucralfate. She reports nausea and vomiting after each meal for the past few days. This is causing her to avoid eating solid foods. Tolerating liquids well. She is having soft BM and passing flatus with some discomfort. Denies CP, issues with urination, smoking, drinking alcohol, and any other issues at this time. Notably, she states that she takes 45 Norcos per month as prescribed for pain management for her knee and uses an albuterol inhaler for her asthma. She has had two vaginal births, no  sections.       PMH:  Past Medical History:   Diagnosis Date    Asthma     Complication of anesthesia     Migraine headache without aura          PSH:  Past Surgical History:   Procedure Laterality Date    APPENDECTOMY      KNEE SURGERY           Allergies:  Review of patient's allergies indicates:  No Known Allergies       Physical Exam:   Vitals:    24 0918   BP: 114/81   Pulse: 85   Resp: 20   Temp: 98.8 °F (37.1 °C)      Gen: NAD, AAOx3   Eye: EOMI   CV: RR  Pulm: NWOB on RA  Abd: soft, non-distended, RUQ pain that is made worse with palpation   Ext:  Move all 4 extremities.       Interval Labs:    None       Interval Imaging:    US Abdomen Limited  Order: [id number]  Status: Final result       Visible to patient: Yes (not seen)       Next appt:  02/05/2025 at 01:00 PM in Gastroenterology ([facility])    0 Result Notes  Details    Reading Physician Reading Date Result Priority   Arnaldo Dillon MD  [phone] 7/20/2024 STAT     Narrative & Impression  EXAMINATION:  US ABDOMEN LIMITED     CLINICAL HISTORY:  Right upper quadrant abdominal pain.  Sharp pain.  Since this morning     TECHNIQUE:  Multiple real-time transverse and longitudinal sections were performed of the right abdomen by the sonographer. Select images were submitted for review.     FINDINGS:  Hepatic parenchyma is echogenic reflective of steatosis.  There is no delineation of discrete hepatic cystic or solid mass. Hepatic maximum diameter of 13.9 cm.  There is unremarkable hepatopedal flow within the portal vein.  Pancreatic tail is obscured.  Visualized portion of the pancreas is without dominant abnormality.     Gallbladder lumen contains sludge and are there also small echogenic foci which could represent some small gallstones.  Gallbladder wall thickness is within normal limits. Common bile duct caliber of 5.5 mm is within normal limits for the age. Sonographer reported negative Pascual's sign.     Normally located right kidney length measures 9.1 x 4.8 x 4.7 cm. Right renal corticomedullary differentiation is unremarkable. No evidence of hydronephrosis.     Impression:     1.  Hepatic steatosis.     2.  Mild gallbladder sludge and suspected small gallstones without ultrasound findings of acute cholecystitis.               A/P: Susan Banerjee is a 29 y.o. female with PMHx of asthma, gastritis, appendectomy, and migraines presents to the general surgery clinic today for evaluation of cholelithiasis and possible surgery. Patient was recently seen in the ED on 07/20 for RUQ pain and n/v/d. US on that date revealed hepatic steatosis and gallbladder sludge with small stones without acute cholecystitis. Today, she endorses stabbing intermittent RUQ rated 9/10 that is worse  after meals and with moving.       - Cholecystectomy scheduled for 08/05/24  - Risks, benefits, alternatives discussed with patient  - Written consent obtained  - Patient advised to continue PPI and sucralfate for gastritis   - Follow up two weeks post op    Shane Siddiqui, MS3  [facility]  07/30/2024     Addendum    Pt seen and examined with medical student. Agree with note above, edited as necessary.         Jyotsna Lara MD   [facility] Resident PGY3

## 2024-07-30 NOTE — PROGRESS NOTES
[facility]   Clinic Note       CC: cholelithiasis       HPI: Susan Banerjee is a 29 y.o. female with PMHx of asthma, gastritis, appendectomy, and migraines presents to the general surgery clinic today for evaluation of cholelithiasis and possible surgery. Patient was recently seen in the ED on  for RUQ pain and n/v/d. US on that date revealed hepatic steatosis and gallbladder sludge with small stones without acute cholecystitis. Today, she endorses stabbing intermittent RUQ rated 9/10 that is worse after meals and with moving. She reports this is a different pain from her gastritis/gastric reflux pain for which she takes protonix and sucralfate. She reports nausea and vomiting after each meal for the past few days. This is causing her to avoid eating solid foods. Tolerating liquids well. She is having soft BM and passing flatus with some discomfort. Denies CP, issues with urination, smoking, drinking alcohol, and any other issues at this time. Notably, she states that she takes 45 Norcos per month as prescribed for pain management for her knee and uses an albuterol inhaler for her asthma. She has had two vaginal births, no  sections.       PMH:  Past Medical History:   Diagnosis Date    Asthma     Complication of anesthesia     Migraine headache without aura          PSH:  Past Surgical History:   Procedure Laterality Date    APPENDECTOMY      KNEE SURGERY           Allergies:  Review of patient's allergies indicates:  No Known Allergies       Physical Exam:   There were no vitals filed for this visit.   Gen: NAD, AAOx3   Eye: EOMI   CV: RR  Pulm: NWOB on RA  Abd: soft, non-distended, RUQ pain that is made worse with palpation   Ext:  Move all 4 extremities.       Interval Labs:    None       Interval Imaging:    US Abdomen Limited  Order: [id number]  Status: Final result       Visible to patient: Yes (not seen)       Next appt: 2025 at 01:00 PM in [facility]  [facility])    0 Result Notes  Details    Reading Physician Reading Date Result Priority   Arnaldo Dillon MD  [phone] 7/20/2024 STAT     Narrative & Impression  EXAMINATION:  US ABDOMEN LIMITED     CLINICAL HISTORY:  Right upper quadrant abdominal pain.  Sharp pain.  Since this morning     TECHNIQUE:  Multiple real-time transverse and longitudinal sections were performed of the right abdomen by the sonographer. Select images were submitted for review.     FINDINGS:  Hepatic parenchyma is echogenic reflective of steatosis.  There is no delineation of discrete hepatic cystic or solid mass. Hepatic maximum diameter of 13.9 cm.  There is unremarkable hepatopedal flow within the portal vein.  Pancreatic tail is obscured.  Visualized portion of the pancreas is without dominant abnormality.     Gallbladder lumen contains sludge and are there also small echogenic foci which could represent some small gallstones.  Gallbladder wall thickness is within normal limits. Common bile duct caliber of 5.5 mm is within normal limits for the age. Sonographer reported negative Pascual's sign.     Normally located right kidney length measures 9.1 x 4.8 x 4.7 cm. Right renal corticomedullary differentiation is unremarkable. No evidence of hydronephrosis.     Impression:     1.  Hepatic steatosis.     2.  Mild gallbladder sludge and suspected small gallstones without ultrasound findings of acute cholecystitis.               A/P: Susan Banerjee is a 29 y.o. female with PMHx of asthma, gastritis, appendectomy, and migraines presents to the general surgery clinic today for evaluation of cholelithiasis and possible surgery. Patient was recently seen in the ED on 07/20 for RUQ pain and n/v/d. US on that date revealed hepatic steatosis and gallbladder sludge with small stones without acute cholecystitis. Today, she endorses stabbing intermittent RUQ rated 9/10 that is worse after meals and with moving.       - Cholecystectomy  scheduled for 08/05/24  - Risks, benefits, alternatives discussed with patient  - Written consent obtained  - Patient advised to continue PPI and sucralfate for gastritis   - Follow up two weeks post op    Shane Siddiqui, MS3  [facility]  07/30/2024     Addendum    Pt seen and examined with medical student. Agree with note above, edited as necessary.         Jyotsna Lara MD   [facility] Resident PGY3

## 2024-08-05 ENCOUNTER — HOSPITAL ENCOUNTER (OUTPATIENT)
Facility: HOSPITAL | Age: 29
Discharge: HOME | End: 2024-08-05
Attending: SURGERY | Admitting: SURGERY
Payer: MEDICAID

## 2024-08-05 ENCOUNTER — ANESTHESIA (OUTPATIENT)
Dept: SURGERY | Facility: HOSPITAL | Age: 29
End: 2024-08-05
Payer: MEDICAID

## 2024-08-05 DIAGNOSIS — K80.20 CALCULUS OF GALLBLADDER WITHOUT CHOLECYSTITIS WITHOUT OBSTRUCTION: ICD-10-CM

## 2024-08-05 LAB
B-HCG UR QL: NEGATIVE
CTP QC/QA: YES

## 2024-08-05 PROCEDURE — 63600175 PHARM REV CODE 636 W HCPCS: Performed by: NURSE ANESTHETIST, CERTIFIED REGISTERED

## 2024-08-05 PROCEDURE — 63600175 PHARM REV CODE 636 W HCPCS

## 2024-08-05 PROCEDURE — 63600175 PHARM REV CODE 636 W HCPCS: Mod: JZ,JG | Performed by: SURGERY

## 2024-08-05 PROCEDURE — 36000709 HC OR TIME LEV III EA ADD 15 MIN: Performed by: SURGERY

## 2024-08-05 PROCEDURE — D9220A PRA ANESTHESIA: Mod: CRNA,,, | Performed by: NURSE ANESTHETIST, CERTIFIED REGISTERED

## 2024-08-05 PROCEDURE — 81025 URINE PREGNANCY TEST: CPT | Performed by: NURSE PRACTITIONER

## 2024-08-05 PROCEDURE — 63600175 PHARM REV CODE 636 W HCPCS: Performed by: ANESTHESIOLOGY

## 2024-08-05 PROCEDURE — 36000708 HC OR TIME LEV III 1ST 15 MIN: Performed by: SURGERY

## 2024-08-05 PROCEDURE — 37000009 HC ANESTHESIA EA ADD 15 MINS: Performed by: SURGERY

## 2024-08-05 PROCEDURE — 37000008 HC ANESTHESIA 1ST 15 MINUTES: Performed by: SURGERY

## 2024-08-05 PROCEDURE — 25000242 PHARM REV CODE 250 ALT 637 W/ HCPCS

## 2024-08-05 PROCEDURE — 71000015 HC POSTOP RECOV 1ST HR: Performed by: SURGERY

## 2024-08-05 PROCEDURE — 88304 TISSUE EXAM BY PATHOLOGIST: CPT | Mod: TC | Performed by: SURGERY

## 2024-08-05 PROCEDURE — 71000016 HC POSTOP RECOV ADDL HR: Performed by: SURGERY

## 2024-08-05 PROCEDURE — 27201423 OPTIME MED/SURG SUP & DEVICES STERILE SUPPLY: Performed by: SURGERY

## 2024-08-05 PROCEDURE — 25000003 PHARM REV CODE 250: Performed by: NURSE ANESTHETIST, CERTIFIED REGISTERED

## 2024-08-05 PROCEDURE — D9220A PRA ANESTHESIA: Mod: ANES,,, | Performed by: ANESTHESIOLOGY

## 2024-08-05 PROCEDURE — 25000003 PHARM REV CODE 250: Performed by: ANESTHESIOLOGY

## 2024-08-05 PROCEDURE — 71000033 HC RECOVERY, INTIAL HOUR: Performed by: SURGERY

## 2024-08-05 RX ORDER — IPRATROPIUM BROMIDE AND ALBUTEROL SULFATE 2.5; .5 MG/3ML; MG/3ML
3 SOLUTION RESPIRATORY (INHALATION) ONCE
Status: COMPLETED | OUTPATIENT
Start: 2024-08-05 | End: 2024-08-05

## 2024-08-05 RX ORDER — DEXAMETHASONE SODIUM PHOSPHATE 4 MG/ML
INJECTION, SOLUTION INTRA-ARTICULAR; INTRALESIONAL; INTRAMUSCULAR; INTRAVENOUS; SOFT TISSUE
Status: DISCONTINUED | OUTPATIENT
Start: 2024-08-05 | End: 2024-08-05

## 2024-08-05 RX ORDER — ONDANSETRON HYDROCHLORIDE 2 MG/ML
4 INJECTION, SOLUTION INTRAVENOUS DAILY PRN
Status: DISCONTINUED | OUTPATIENT
Start: 2024-08-05 | End: 2024-08-05 | Stop reason: HOSPADM

## 2024-08-05 RX ORDER — FENTANYL CITRATE 50 UG/ML
INJECTION, SOLUTION INTRAMUSCULAR; INTRAVENOUS
Status: DISCONTINUED | OUTPATIENT
Start: 2024-08-05 | End: 2024-08-05

## 2024-08-05 RX ORDER — ONDANSETRON HYDROCHLORIDE 2 MG/ML
INJECTION, SOLUTION INTRAVENOUS
Status: DISCONTINUED | OUTPATIENT
Start: 2024-08-05 | End: 2024-08-05

## 2024-08-05 RX ORDER — KETOROLAC TROMETHAMINE 30 MG/ML
INJECTION, SOLUTION INTRAMUSCULAR; INTRAVENOUS
Status: DISCONTINUED | OUTPATIENT
Start: 2024-08-05 | End: 2024-08-05

## 2024-08-05 RX ORDER — SODIUM CHLORIDE 9 MG/ML
INJECTION, SOLUTION INTRAVENOUS CONTINUOUS
Status: DISCONTINUED | OUTPATIENT
Start: 2024-08-05 | End: 2024-08-05 | Stop reason: HOSPADM

## 2024-08-05 RX ORDER — SODIUM CHLORIDE 0.9 % (FLUSH) 0.9 %
10 SYRINGE (ML) INJECTION
Status: DISCONTINUED | OUTPATIENT
Start: 2024-08-05 | End: 2024-08-05 | Stop reason: HOSPADM

## 2024-08-05 RX ORDER — LABETALOL HYDROCHLORIDE 5 MG/ML
INJECTION, SOLUTION INTRAVENOUS
Status: DISCONTINUED | OUTPATIENT
Start: 2024-08-05 | End: 2024-08-05

## 2024-08-05 RX ORDER — MIDAZOLAM HYDROCHLORIDE 2 MG/2ML
2 INJECTION, SOLUTION INTRAMUSCULAR; INTRAVENOUS ONCE AS NEEDED
Status: COMPLETED | OUTPATIENT
Start: 2024-08-05 | End: 2024-08-05

## 2024-08-05 RX ORDER — MORPHINE SULFATE 2 MG/ML
2 INJECTION, SOLUTION INTRAMUSCULAR; INTRAVENOUS EVERY 5 MIN PRN
Status: DISCONTINUED | OUTPATIENT
Start: 2024-08-05 | End: 2024-08-05 | Stop reason: HOSPADM

## 2024-08-05 RX ORDER — PROMETHAZINE HYDROCHLORIDE 25 MG/ML
25 INJECTION, SOLUTION INTRAMUSCULAR; INTRAVENOUS ONCE
Status: DISCONTINUED | OUTPATIENT
Start: 2024-08-05 | End: 2024-08-05

## 2024-08-05 RX ORDER — SODIUM CHLORIDE, SODIUM LACTATE, POTASSIUM CHLORIDE, CALCIUM CHLORIDE 600; 310; 30; 20 MG/100ML; MG/100ML; MG/100ML; MG/100ML
INJECTION, SOLUTION INTRAVENOUS CONTINUOUS
Status: DISCONTINUED | OUTPATIENT
Start: 2024-08-05 | End: 2024-08-05 | Stop reason: HOSPADM

## 2024-08-05 RX ORDER — ROCURONIUM BROMIDE 10 MG/ML
INJECTION, SOLUTION INTRAVENOUS
Status: DISCONTINUED | OUTPATIENT
Start: 2024-08-05 | End: 2024-08-05

## 2024-08-05 RX ORDER — IPRATROPIUM BROMIDE AND ALBUTEROL SULFATE 2.5; .5 MG/3ML; MG/3ML
SOLUTION RESPIRATORY (INHALATION)
Status: COMPLETED
Start: 2024-08-05 | End: 2024-08-05

## 2024-08-05 RX ORDER — MEPERIDINE HYDROCHLORIDE 25 MG/ML
12.5 INJECTION INTRAMUSCULAR; INTRAVENOUS; SUBCUTANEOUS EVERY 10 MIN PRN
Status: DISCONTINUED | OUTPATIENT
Start: 2024-08-05 | End: 2024-08-05 | Stop reason: HOSPADM

## 2024-08-05 RX ORDER — OXYCODONE HYDROCHLORIDE 5 MG/1
5 TABLET ORAL
Status: DISCONTINUED | OUTPATIENT
Start: 2024-08-05 | End: 2024-08-05 | Stop reason: HOSPADM

## 2024-08-05 RX ORDER — PROPOFOL 10 MG/ML
VIAL (ML) INTRAVENOUS
Status: DISCONTINUED | OUTPATIENT
Start: 2024-08-05 | End: 2024-08-05

## 2024-08-05 RX ORDER — ONDANSETRON 4 MG/1
4 TABLET, ORALLY DISINTEGRATING ORAL EVERY 6 HOURS PRN
Qty: 16 TABLET | Refills: 0 | Status: SHIPPED | OUTPATIENT
Start: 2024-08-05

## 2024-08-05 RX ORDER — OXYCODONE HYDROCHLORIDE 5 MG/1
5 TABLET ORAL EVERY 6 HOURS PRN
Qty: 16 TABLET | Refills: 0 | Status: SHIPPED | OUTPATIENT
Start: 2024-08-05

## 2024-08-05 RX ORDER — CEFAZOLIN SODIUM 1 G/3ML
2 INJECTION, POWDER, FOR SOLUTION INTRAMUSCULAR; INTRAVENOUS
Status: COMPLETED | OUTPATIENT
Start: 2024-08-05 | End: 2024-08-05

## 2024-08-05 RX ORDER — BUPIVACAINE HYDROCHLORIDE 2.5 MG/ML
INJECTION, SOLUTION EPIDURAL; INFILTRATION; INTRACAUDAL
Status: DISCONTINUED | OUTPATIENT
Start: 2024-08-05 | End: 2024-08-05 | Stop reason: HOSPADM

## 2024-08-05 RX ORDER — DEXMEDETOMIDINE HYDROCHLORIDE 100 UG/ML
INJECTION, SOLUTION INTRAVENOUS
Status: DISCONTINUED | OUTPATIENT
Start: 2024-08-05 | End: 2024-08-05

## 2024-08-05 RX ORDER — GLUCAGON 1 MG
1 KIT INJECTION
Status: DISCONTINUED | OUTPATIENT
Start: 2024-08-05 | End: 2024-08-05 | Stop reason: HOSPADM

## 2024-08-05 RX ORDER — LIDOCAINE HYDROCHLORIDE 20 MG/ML
INJECTION INTRAVENOUS
Status: DISCONTINUED | OUTPATIENT
Start: 2024-08-05 | End: 2024-08-05

## 2024-08-05 RX ORDER — HEPARIN SODIUM 5000 [USP'U]/ML
5000 INJECTION, SOLUTION INTRAVENOUS; SUBCUTANEOUS
Status: COMPLETED | OUTPATIENT
Start: 2024-08-05 | End: 2024-08-05

## 2024-08-05 RX ADMIN — SODIUM CHLORIDE, POTASSIUM CHLORIDE, SODIUM LACTATE AND CALCIUM CHLORIDE: 600; 310; 30; 20 INJECTION, SOLUTION INTRAVENOUS at 08:08

## 2024-08-05 RX ADMIN — MIDAZOLAM HYDROCHLORIDE 2 MG: 1 INJECTION, SOLUTION INTRAMUSCULAR; INTRAVENOUS at 08:08

## 2024-08-05 RX ADMIN — SODIUM CHLORIDE, POTASSIUM CHLORIDE, SODIUM LACTATE AND CALCIUM CHLORIDE: 600; 310; 30; 20 INJECTION, SOLUTION INTRAVENOUS at 09:08

## 2024-08-05 RX ADMIN — ROCURONIUM BROMIDE 50 MG: 10 INJECTION INTRAVENOUS at 09:08

## 2024-08-05 RX ADMIN — DEXMEDETOMIDINE 20 MCG: 200 INJECTION, SOLUTION INTRAVENOUS at 09:08

## 2024-08-05 RX ADMIN — SUGAMMADEX 200 MG: 100 INJECTION, SOLUTION INTRAVENOUS at 10:08

## 2024-08-05 RX ADMIN — PROPOFOL 200 MG: 10 INJECTION, EMULSION INTRAVENOUS at 09:08

## 2024-08-05 RX ADMIN — LABETALOL HYDROCHLORIDE 10 MG: 5 INJECTION INTRAVENOUS at 09:08

## 2024-08-05 RX ADMIN — ONDANSETRON 4 MG: 2 INJECTION INTRAMUSCULAR; INTRAVENOUS at 10:08

## 2024-08-05 RX ADMIN — CEFAZOLIN 2 G: 330 INJECTION, POWDER, FOR SOLUTION INTRAMUSCULAR; INTRAVENOUS at 09:08

## 2024-08-05 RX ADMIN — HEPARIN SODIUM 5000 UNITS: 5000 INJECTION, SOLUTION INTRAVENOUS; SUBCUTANEOUS at 06:08

## 2024-08-05 RX ADMIN — IPRATROPIUM BROMIDE AND ALBUTEROL SULFATE 3 ML: 2.5; .5 SOLUTION RESPIRATORY (INHALATION) at 10:08

## 2024-08-05 RX ADMIN — LIDOCAINE HYDROCHLORIDE 100 MG: 20 INJECTION INTRAVENOUS at 09:08

## 2024-08-05 RX ADMIN — OXYCODONE HYDROCHLORIDE 5 MG: 5 TABLET ORAL at 11:08

## 2024-08-05 RX ADMIN — FENTANYL CITRATE 100 MCG: 50 INJECTION INTRAMUSCULAR; INTRAVENOUS at 09:08

## 2024-08-05 RX ADMIN — IPRATROPIUM BROMIDE AND ALBUTEROL SULFATE 3 ML: .5; 3 SOLUTION RESPIRATORY (INHALATION) at 10:08

## 2024-08-05 RX ADMIN — DEXAMETHASONE SODIUM PHOSPHATE 8 MG: 4 INJECTION, SOLUTION INTRA-ARTICULAR; INTRALESIONAL; INTRAMUSCULAR; INTRAVENOUS; SOFT TISSUE at 09:08

## 2024-08-05 RX ADMIN — KETOROLAC TROMETHAMINE 30 MG: 30 INJECTION, SOLUTION INTRAMUSCULAR at 09:08

## 2024-08-06 VITALS
SYSTOLIC BLOOD PRESSURE: 107 MMHG | BODY MASS INDEX: 35.18 KG/M2 | DIASTOLIC BLOOD PRESSURE: 72 MMHG | WEIGHT: 186.19 LBS | TEMPERATURE: 99 F | OXYGEN SATURATION: 95 % | HEART RATE: 84 BPM | RESPIRATION RATE: 18 BRPM

## 2024-08-06 LAB
ESTROGEN SERPL-MCNC: NORMAL PG/ML
INSULIN SERPL-ACNC: NORMAL U[IU]/ML
LAB AP CLINICAL INFORMATION: NORMAL
LAB AP GROSS DESCRIPTION: NORMAL
LAB AP REPORT FOOTNOTES: NORMAL
T3RU NFR SERPL: NORMAL %

## 2024-08-20 ENCOUNTER — OFFICE VISIT (OUTPATIENT)
Dept: SURGERY | Facility: CLINIC | Age: 29
End: 2024-08-20
Payer: MEDICAID

## 2024-08-20 VITALS
HEART RATE: 74 BPM | RESPIRATION RATE: 20 BRPM | SYSTOLIC BLOOD PRESSURE: 102 MMHG | DIASTOLIC BLOOD PRESSURE: 63 MMHG | HEIGHT: 61 IN | WEIGHT: 185.81 LBS | OXYGEN SATURATION: 99 % | BODY MASS INDEX: 35.08 KG/M2 | TEMPERATURE: 98 F

## 2024-08-20 DIAGNOSIS — K80.20 CALCULUS OF GALLBLADDER WITHOUT CHOLECYSTITIS WITHOUT OBSTRUCTION: Primary | ICD-10-CM

## 2024-08-20 PROCEDURE — 99214 OFFICE O/P EST MOD 30 MIN: CPT | Mod: PBBFAC

## 2024-08-20 NOTE — PROGRESS NOTES
[facility]   Clinic Note       CC: Post op       HPI: Susan Banerjee is a 29 y.o. female with PMHx of cholelithiasis s/p lap aruna on 8/5. Doing well since surgery, returned to previous activity without issue, tolerating a diet and denies diarrhea/constipation.             Physical Exam:   Vitals:    08/20/24 0921   BP: 102/63   Pulse: 74   Resp: 20   Temp: 97.9 °F (36.6 °C)      Gen: NAD, AAOx3   Eye: EOMI   CV: RR  Pulm: NWOB on RA  Abd: soft, non-tender, non-distended. Incisions healing well  Ext:  Move all 4 extremities.             Interval Pathology:    Final Diagnosis      Gallbladder, cholecystectomy:  - Chronic cholecystitis and cholesterolosis.            A/P: Susan Banerjee is a 29 y.o. female with PMHx of cholelithiasis s/p lap aruna. Doing well       - Cleared to return to work and normal activity  - Follow up as needed    Trudy Lopez MD  [facility] PGY-5

## 2024-12-15 ENCOUNTER — HOSPITAL ENCOUNTER (EMERGENCY)
Facility: HOSPITAL | Age: 29
Discharge: HOME | End: 2024-12-15
Attending: EMERGENCY MEDICINE
Payer: MEDICAID

## 2024-12-15 VITALS
DIASTOLIC BLOOD PRESSURE: 71 MMHG | SYSTOLIC BLOOD PRESSURE: 113 MMHG | BODY MASS INDEX: 34.8 KG/M2 | TEMPERATURE: 99 F | OXYGEN SATURATION: 99 % | RESPIRATION RATE: 20 BRPM | HEIGHT: 60 IN | HEART RATE: 96 BPM | WEIGHT: 177.25 LBS

## 2024-12-15 DIAGNOSIS — L03.116 LEFT LEG CELLULITIS: Primary | ICD-10-CM

## 2024-12-15 LAB
B-HCG UR QL: NEGATIVE
CTP QC/QA: YES

## 2024-12-15 PROCEDURE — 81025 URINE PREGNANCY TEST: CPT | Performed by: PHYSICIAN ASSISTANT

## 2024-12-15 PROCEDURE — 99283 EMERGENCY DEPT VISIT LOW MDM: CPT

## 2024-12-15 RX ORDER — IBUPROFEN 600 MG/1
600 TABLET ORAL EVERY 6 HOURS PRN
Qty: 20 TABLET | Refills: 0 | Status: SHIPPED | OUTPATIENT
Start: 2024-12-15

## 2024-12-15 RX ORDER — SULFAMETHOXAZOLE AND TRIMETHOPRIM 800; 160 MG/1; MG/1
1 TABLET ORAL 2 TIMES DAILY
Qty: 14 TABLET | Refills: 0 | Status: SHIPPED | OUTPATIENT
Start: 2024-12-15 | End: 2024-12-22

## 2024-12-16 NOTE — ED PROVIDER NOTES
Encounter Date: 12/15/2024       History     Chief Complaint   Patient presents with    Insect Bite     Possible insect bite to left upper posterior leg with localized area of redness and swelling that she noticed yesterday.      Susan Banerjee is a 29 y.o. female with a history of asthma and migraines who presents to the ED with concerns for infected insect bite. Pt reports something bit the back of her left upper leg 2 days ago and today the area became red and tender to touch. Also reports warmth. Denies drainage, fever, leg swelling.     The history is provided by the patient.     Review of patient's allergies indicates:  No Known Allergies  Past Medical History:   Diagnosis Date    Asthma     Migraine headache without aura      Past Surgical History:   Procedure Laterality Date    APPENDECTOMY      KNEE SURGERY      LAPAROSCOPIC CHOLECYSTECTOMY N/A 8/5/2024    Procedure: CHOLECYSTECTOMY, LAPAROSCOPIC;  Surgeon: Young Stubbs Jr., MD;  Location: [facility];  Service: General;  Laterality: N/A;     Family History   Problem Relation Name Age of Onset    Diabetes Maternal Grandfather      Lung cancer Mother       Social History     Tobacco Use    Smoking status: Never     Passive exposure: Never    Smokeless tobacco: Never   Substance Use Topics    Alcohol use: Yes    Drug use: Never     Review of Systems   Constitutional:  Negative for fever.   HENT:  Negative for sore throat.    Respiratory:  Negative for shortness of breath.    Cardiovascular:  Negative for chest pain.   Gastrointestinal:  Negative for nausea.   Genitourinary:  Negative for dysuria.   Musculoskeletal:  Negative for back pain.   Skin:  Positive for color change. Negative for rash.   Neurological:  Negative for weakness.   Hematological:  Does not bruise/bleed easily.       Physical Exam     Initial Vitals [12/15/24 1849]   BP Pulse Resp Temp SpO2   113/71 102 20 98.6 °F (37 °C) 98 %      MAP       --         Physical  Exam    Nursing note and vitals reviewed.  Constitutional: She appears well-developed and well-nourished. No distress.   HENT:   Head: Normocephalic and atraumatic. Mouth/Throat: No oropharyngeal exudate.   Eyes: EOM are normal. No scleral icterus.   Neck: Neck supple.   Normal range of motion.  Cardiovascular:  Normal rate and regular rhythm.           No murmur heard.  Pulmonary/Chest: Breath sounds normal. No respiratory distress. She has no wheezes.   Abdominal: Abdomen is soft. Bowel sounds are normal. She exhibits no distension. There is no abdominal tenderness.   Musculoskeletal:         General: No tenderness. Normal range of motion.      Cervical back: Normal range of motion and neck supple.     Neurological: She is alert and oriented to person, place, and time. No cranial nerve deficit.   Skin: Skin is warm and dry. Capillary refill takes less than 2 seconds. There is erythema.   Insect bite to posterior left upper leg with surrounding warmth and erythema. No fluctuance or drainage.    Psychiatric: She has a normal mood and affect. Her behavior is normal. Judgment and thought content normal.         ED Course   Procedures  Labs Reviewed   POCT URINE PREGNANCY       Result Value    POC Preg Test, Ur Negative       Acceptable Yes            Imaging Results    None          Medications - No data to display  Medical Decision Making  Differential: cellulitis, abscess, among others    ED management: HDS and afebrile. Exam findings consistent with cellulitis. Pt stable for discharge with bactrim BID x 7 days. Instructed to follow up with PCP in 3 days. ED return precautions given. She verbalized understanding. All questions answered.     Amount and/or Complexity of Data Reviewed  Labs: ordered.    Risk  Prescription drug management.                                      Clinical Impression:  Final diagnoses:  [L03.116] Left leg cellulitis (Primary)          ED Disposition Condition    Discharge  Stable          ED Prescriptions       Medication Sig Dispense Start Date End Date Auth. Provider    sulfamethoxazole-trimethoprim 800-160mg (BACTRIM DS) 800-160 mg Tab Take 1 tablet by mouth 2 (two) times daily. for 7 days 14 tablet 12/15/2024 12/22/2024 Rohini Bruno PA-C    ibuprofen (ADVIL,MOTRIN) 600 MG tablet Take 1 tablet (600 mg total) by mouth every 6 (six) hours as needed for Pain. 20 tablet 12/15/2024 -- Rohini Bruno PA-C          Follow-up Information       Follow up With Specialties Details Why Contact Info    [facility] Emergency Medicine  If symptoms worsen [address] [facility] [address] [phone]    Dory Parsons, NP Family Medicine In 3 days Hospital follow up [address]   [place] [address]  [phone]               Rohini Bruno PA-C  12/15/24 1946

## 2025-02-05 ENCOUNTER — OFFICE VISIT (OUTPATIENT)
Dept: GASTROENTEROLOGY | Facility: CLINIC | Age: 30
End: 2025-02-05
Payer: MEDICAID

## 2025-02-05 ENCOUNTER — LAB VISIT (OUTPATIENT)
Dept: LAB | Facility: HOSPITAL | Age: 30
End: 2025-02-05
Attending: NURSE PRACTITIONER
Payer: MEDICAID

## 2025-02-05 VITALS
BODY MASS INDEX: 33.34 KG/M2 | WEIGHT: 169.81 LBS | RESPIRATION RATE: 16 BRPM | HEIGHT: 60 IN | OXYGEN SATURATION: 97 % | DIASTOLIC BLOOD PRESSURE: 70 MMHG | TEMPERATURE: 98 F | SYSTOLIC BLOOD PRESSURE: 107 MMHG | HEART RATE: 86 BPM

## 2025-02-05 DIAGNOSIS — K76.0 HEPATIC STEATOSIS: ICD-10-CM

## 2025-02-05 DIAGNOSIS — K21.9 GASTROESOPHAGEAL REFLUX DISEASE, UNSPECIFIED WHETHER ESOPHAGITIS PRESENT: ICD-10-CM

## 2025-02-05 DIAGNOSIS — R11.2 NAUSEA AND VOMITING, UNSPECIFIED VOMITING TYPE: ICD-10-CM

## 2025-02-05 DIAGNOSIS — R10.13 EPIGASTRIC ABDOMINAL PAIN: ICD-10-CM

## 2025-02-05 DIAGNOSIS — R10.13 EPIGASTRIC ABDOMINAL PAIN: Primary | ICD-10-CM

## 2025-02-05 LAB
ALBUMIN SERPL-MCNC: 3.9 G/DL (ref 3.5–5)
ALBUMIN/GLOB SERPL: 0.9 RATIO (ref 1.1–2)
ALP SERPL-CCNC: 77 UNIT/L (ref 40–150)
ALT SERPL-CCNC: 13 UNIT/L (ref 0–55)
ANION GAP SERPL CALC-SCNC: 6 MEQ/L
AST SERPL-CCNC: 19 UNIT/L (ref 5–34)
BASOPHILS # BLD AUTO: 0.04 X10(3)/MCL
BASOPHILS NFR BLD AUTO: 0.6 %
BILIRUB SERPL-MCNC: 0.4 MG/DL
BUN SERPL-MCNC: 20 MG/DL (ref 7–18.7)
CALCIUM SERPL-MCNC: 9.4 MG/DL (ref 8.4–10.2)
CHLORIDE SERPL-SCNC: 107 MMOL/L (ref 98–107)
CO2 SERPL-SCNC: 26 MMOL/L (ref 22–29)
CREAT SERPL-MCNC: 0.9 MG/DL (ref 0.55–1.02)
CREAT/UREA NIT SERPL: 22
EOSINOPHIL # BLD AUTO: 0.14 X10(3)/MCL (ref 0–0.9)
EOSINOPHIL NFR BLD AUTO: 1.9 %
ERYTHROCYTE [DISTWIDTH] IN BLOOD BY AUTOMATED COUNT: 12.6 % (ref 11.5–17)
GFR SERPLBLD CREATININE-BSD FMLA CKD-EPI: >60 ML/MIN/1.73/M2
GLOBULIN SER-MCNC: 4.4 GM/DL (ref 2.4–3.5)
GLUCOSE SERPL-MCNC: 89 MG/DL (ref 74–100)
HCT VFR BLD AUTO: 43.9 % (ref 37–47)
HGB BLD-MCNC: 14.1 G/DL (ref 12–16)
IMM GRANULOCYTES # BLD AUTO: 0.02 X10(3)/MCL (ref 0–0.04)
IMM GRANULOCYTES NFR BLD AUTO: 0.3 %
LIPASE SERPL-CCNC: 37 U/L
LYMPHOCYTES # BLD AUTO: 2.31 X10(3)/MCL (ref 0.6–4.6)
LYMPHOCYTES NFR BLD AUTO: 31.8 %
MCH RBC QN AUTO: 28 PG (ref 27–31)
MCHC RBC AUTO-ENTMCNC: 32.1 G/DL (ref 33–36)
MCV RBC AUTO: 87.1 FL (ref 80–94)
MONOCYTES # BLD AUTO: 0.35 X10(3)/MCL (ref 0.1–1.3)
MONOCYTES NFR BLD AUTO: 4.8 %
NEUTROPHILS # BLD AUTO: 4.41 X10(3)/MCL (ref 2.1–9.2)
NEUTROPHILS NFR BLD AUTO: 60.6 %
NRBC BLD AUTO-RTO: 0 %
PLATELET # BLD AUTO: 235 X10(3)/MCL (ref 130–400)
PMV BLD AUTO: 10.3 FL (ref 7.4–10.4)
POTASSIUM SERPL-SCNC: 4 MMOL/L (ref 3.5–5.1)
PROT SERPL-MCNC: 8.3 GM/DL (ref 6.4–8.3)
RBC # BLD AUTO: 5.04 X10(6)/MCL (ref 4.2–5.4)
SODIUM SERPL-SCNC: 139 MMOL/L (ref 136–145)
WBC # BLD AUTO: 7.27 X10(3)/MCL (ref 4.5–11.5)

## 2025-02-05 PROCEDURE — 1160F RVW MEDS BY RX/DR IN RCRD: CPT | Mod: CPTII,,, | Performed by: NURSE PRACTITIONER

## 2025-02-05 PROCEDURE — 99204 OFFICE O/P NEW MOD 45 MIN: CPT | Mod: S$PBB,,, | Performed by: NURSE PRACTITIONER

## 2025-02-05 PROCEDURE — 83690 ASSAY OF LIPASE: CPT

## 2025-02-05 PROCEDURE — 36415 COLL VENOUS BLD VENIPUNCTURE: CPT

## 2025-02-05 PROCEDURE — 80053 COMPREHEN METABOLIC PANEL: CPT

## 2025-02-05 PROCEDURE — 1159F MED LIST DOCD IN RCRD: CPT | Mod: CPTII,,, | Performed by: NURSE PRACTITIONER

## 2025-02-05 PROCEDURE — 99215 OFFICE O/P EST HI 40 MIN: CPT | Mod: PBBFAC | Performed by: NURSE PRACTITIONER

## 2025-02-05 PROCEDURE — 3008F BODY MASS INDEX DOCD: CPT | Mod: CPTII,,, | Performed by: NURSE PRACTITIONER

## 2025-02-05 PROCEDURE — 85025 COMPLETE CBC W/AUTO DIFF WBC: CPT

## 2025-02-05 PROCEDURE — 3074F SYST BP LT 130 MM HG: CPT | Mod: CPTII,,, | Performed by: NURSE PRACTITIONER

## 2025-02-05 PROCEDURE — 3078F DIAST BP <80 MM HG: CPT | Mod: CPTII,,, | Performed by: NURSE PRACTITIONER

## 2025-02-05 RX ORDER — PANTOPRAZOLE SODIUM 40 MG/1
40 TABLET, DELAYED RELEASE ORAL DAILY
Qty: 30 TABLET | Refills: 5 | Status: SHIPPED | OUTPATIENT
Start: 2025-02-05

## 2025-02-05 RX ORDER — ONDANSETRON 4 MG/1
4 TABLET, ORALLY DISINTEGRATING ORAL EVERY 8 HOURS PRN
Qty: 60 TABLET | Refills: 1 | Status: SHIPPED | OUTPATIENT
Start: 2025-02-05

## 2025-02-05 NOTE — PROGRESS NOTES
Subjective:       Patient ID: Susan Banerjee is a 29 y.o. female.    Chief Complaint: gastritis (Yesterday pt states that she had a flare up and states that she was burping and it was burning really bad. States she took her last pantoprazole)    This 29-year-old  female with asthma, migraine headaches, cholecystectomy August 5, 2024 is referred for abdominal pain.  She presents unaccompanied.  She presented to the ED July 22, 2024 with reports of epigastric abdominal pain with nausea, vomiting, and diarrhea for the past few days.  She had taken Protonix and Carafate that morning with minimal relief.  Upon arrival to ED, pulse rate was elevated at 112 and vital signs were otherwise stable.  Physical exam revealed tenderness to epigastric abdominal region without rebound or guarding.  Laboratory results revealed unremarkable CMP; WBC 14.49 and otherwise unremarkable CBC; urinalysis with 2+ nitrites, 250 leukocyte esterase, 21-50 WBC, many bacteria; negative troponin; lipase 22; negative influenza and COVID testing; negative urine pregnancy test.  EKG revealed sinus tachycardia with pulse rate of 123.  Abdominal ultrasound revealed hepatic steatosis and mild gallbladder sludge with suspected small gallstones without ultrasound findings of acute cholecystitis.  She was administered Rocephin IV and discharged home with prescriptions for Bentyl 20 mg 4 times daily for 7 days, Zofran 4 mg every 8 hours as needed for nausea and vomiting, cefdinir 300 mg twice daily for 10 days.  She was recommended outpatient follow-up with GI clinic.    Today, she presents for an initial visit.  She reports intermittent epigastric abdominal burning and sharp pain for the past several years that has worsened over time and become more frequent over time.  She has experienced the pain 3 times over the past 2 weeks and the pain has been noted approximately 1-2 times per week.  The pain will occasionally radiate to her left  upper quadrant abdominal region.  The pain will worsen with eating but she is unable to identify specific triggers.  She has ate very little food since Sunday due to severity of pain.  Her appetite is fair she reports an approximate unintentional weight loss of 20 lb since November 2024.  She has occasional nausea with subsequent vomiting (emesis nonbilious and nonbloody).  Nausea and vomiting are improved with Zofran as needed.  She has frequent burning at the back of her throat and esophagus with frequent belching.  She denies fever, chills, hematemesis, odynophagia, dysphagia, or early satiety.  Bowel habits are described as formed to soft stools 1-3 times daily without melena, hematochezia, fecal urgency, fecal incontinence, or pain with defecation.  She has a Mirena in place for the past 2 years.  She reports taking pantoprazole 40 mg daily for the past several months with some relief noted.  She has been out of pantoprazole since yesterday, as well as Zofran and is requesting a refill.    She denies ever having an EGD or colonoscopy done. She denies regular NSAID use or use of blood thinners. She denies tobacco use and drinks alcohol on occasion.  She denies illicit drug use. She denies a family history of IBD, colon polyps, or colon cancer.    CBC Review of patient's allergies indicates:  No Known Allergies    Past Medical History:   Diagnosis Date    Asthma     GERD (gastroesophageal reflux disease) 1995    Migraine headache without aura      Past Surgical History:   Procedure Laterality Date    APPENDECTOMY      CHOLECYSTECTOMY  08/05/25    KNEE SURGERY      LAPAROSCOPIC CHOLECYSTECTOMY N/A 08/05/2024    Procedure: CHOLECYSTECTOMY, LAPAROSCOPIC;  Surgeon: Young Stubbs Jr., MD;  Location: [facility];  Service: General;  Laterality: N/A;     Family History:   family history includes Diabetes in her maternal grandfather; Lung cancer in her mother.    Social History:    reports that she has never  smoked. She has never been exposed to tobacco smoke. She has never used smokeless tobacco. She reports current alcohol use. She reports that she does not use drugs.    Review of Systems  Negative except as noted in the HPI.      Objective:      Physical Exam  Constitutional:       Appearance: Normal appearance.   HENT:      Head: Normocephalic.      Mouth/Throat:      Mouth: Mucous membranes are moist.   Eyes:      Extraocular Movements: Extraocular movements intact.      Conjunctiva/sclera: Conjunctivae normal.      Pupils: Pupils are equal, round, and reactive to light.   Cardiovascular:      Rate and Rhythm: Normal rate and regular rhythm.      Pulses: Normal pulses.      Heart sounds: Normal heart sounds.   Pulmonary:      Effort: Pulmonary effort is normal.      Breath sounds: Normal breath sounds.   Abdominal:      General: Bowel sounds are normal.      Palpations: Abdomen is soft.      Comments: Epigastric abdominal tenderness with deep palpation, no rebound or guarding   Musculoskeletal:         General: Normal range of motion.      Cervical back: Normal range of motion and neck supple.   Skin:     General: Skin is warm and dry.   Neurological:      General: No focal deficit present.      Mental Status: She is alert and oriented to person, place, and time.   Psychiatric:         Mood and Affect: Mood normal.         Behavior: Behavior normal.         Thought Content: Thought content normal.         Judgment: Judgment normal.         Home Medications:     Current Outpatient Medications   Medication Sig    albuterol (PROVENTIL/VENTOLIN HFA) 90 mcg/actuation inhaler 2 puffs every 4 (four) hours as needed.    fluticasone-salmeterol diskus inhaler 250-50 mcg Advair Diskus 250 mcg-50 mcg/dose powder for inhalation   Inhale 1 puff twice a day by inhalation route for 30 days.    sucralfate (CARAFATE) 1 gram tablet Take 1 g by mouth 4 (four) times daily.    HYDROcodone-acetaminophen (NORCO) 5-325 mg per tablet  SMARTSI Tablet(s) By Mouth 1-2 Times Daily    ibuprofen (ADVIL,MOTRIN) 600 MG tablet Take 1 tablet (600 mg total) by mouth every 8 (eight) hours as needed for Pain.    ibuprofen (ADVIL,MOTRIN) 600 MG tablet Take 1 tablet (600 mg total) by mouth every 6 (six) hours as needed for Pain.    loratadine (CLARITIN) 10 mg tablet loratadine 10 mg tablet   Take 1 tablet every day by oral route for 30 days. (Patient not taking: Reported on 2025)    ofloxacin (FLOXIN) 0.3 % otic solution     ondansetron (ZOFRAN) 4 MG tablet Take 4 mg by mouth every 8 (eight) hours as needed.    ondansetron (ZOFRAN-ODT) 4 MG TbDL Take 1 tablet (4 mg total) by mouth every 8 (eight) hours as needed.    oxyCODONE (ROXICODONE) 5 MG immediate release tablet Take 1 tablet (5 mg total) by mouth every 6 (six) hours as needed for Pain.    pantoprazole (PROTONIX) 40 MG tablet Take 1 tablet (40 mg total) by mouth once daily.     Current Facility-Administered Medications   Medication Frequency    levonorgestreL (MIRENA) 21 mcg/24 hours (8 yrs) 52 mg IUD 1 Intra Uterine Device      Laboratory Results:     Recent Results (from the past 30 weeks)   EKG 12-lead    Collection Time: 24  2:57 PM   Result Value Ref Range    QRS Duration 84 ms    [facility] QTC Calculation 472 ms   Comprehensive metabolic panel    Collection Time: 24  3:41 PM   Result Value Ref Range    Sodium 138 136 - 145 mmol/L    Potassium 4.4 3.5 - 5.1 mmol/L    Chloride 104 98 - 107 mmol/L    CO2 24 22 - 29 mmol/L    Glucose 107 (H) 74 - 100 mg/dL    Blood Urea Nitrogen 15.1 7.0 - 18.7 mg/dL    Creatinine 0.86 0.55 - 1.02 mg/dL    Calcium 9.9 8.4 - 10.2 mg/dL    Protein Total 8.3 6.4 - 8.3 gm/dL    Albumin 4.0 3.5 - 5.0 g/dL    Globulin 4.3 (H) 2.4 - 3.5 gm/dL    Albumin/Globulin Ratio 0.9 (L) 1.1 - 2.0 ratio    Bilirubin Total 0.4 <=1.5 mg/dL    ALP 86 40 - 150 unit/L    ALT 20 0 - 55 unit/L    AST 22 5 - 34 unit/L    eGFR >60 mL/min/1.73/m2    Anion Gap 10.0 mEq/L     BUN/Creatinine Ratio 18    Troponin I    Collection Time: 07/20/24  3:41 PM   Result Value Ref Range    Troponin-I <0.010 0.000 - 0.045 ng/mL   Lipase    Collection Time: 07/20/24  3:41 PM   Result Value Ref Range    Lipase Level 22 <=60 U/L   CBC with Differential    Collection Time: 07/20/24  3:41 PM   Result Value Ref Range    WBC 14.49 (H) 4.50 - 11.50 x10(3)/mcL    RBC 5.64 (H) 4.20 - 5.40 x10(6)/mcL    Hgb 15.9 12.0 - 16.0 g/dL    Hct 49.9 (H) 37.0 - 47.0 %    MCV 88.5 80.0 - 94.0 fL    MCH 28.2 27.0 - 31.0 pg    MCHC 31.9 (L) 33.0 - 36.0 g/dL    RDW 12.6 11.5 - 17.0 %    Platelet 245 130 - 400 x10(3)/mcL    MPV 9.9 7.4 - 10.4 fL    Neut % 84.6 %    Lymph % 11.5 %    Mono % 2.7 %    Eos % 0.6 %    Basophil % 0.2 %    Lymph # 1.66 0.6 - 4.6 x10(3)/mcL    Neut # 12.26 (H) 2.1 - 9.2 x10(3)/mcL    Mono # 0.39 0.1 - 1.3 x10(3)/mcL    Eos # 0.09 0 - 0.9 x10(3)/mcL    Baso # 0.03 <=0.2 x10(3)/mcL    Imm Gran # 0.06 (H) 0 - 0.04 x10(3)/mcL    Imm Grans % 0.4 %    NRBC% 0.0 %   Urinalysis, Reflex to Urine Culture    Collection Time: 07/20/24  3:51 PM    Specimen: Urine   Result Value Ref Range    Color, UA Light-Yellow Yellow, Light-Yellow, Colorless, Straw, Dark-Yellow    Appearance, UA Turbid (A) Clear    Specific Gravity, UA 1.022 1.005 - 1.030    pH, UA 6.0 5.0 - 8.5    Protein, UA Negative Negative    Glucose, UA Normal Negative, Normal    Ketones, UA Negative Negative    Blood, UA Negative Negative    Bilirubin, UA Negative Negative    Urobilinogen, UA Normal 0.2, 1.0, Normal    Nitrites, UA 2+ (A) Negative    Leukocyte Esterase,  (A) Negative    RBC, UA 0-5 None Seen, 0-2, 3-5, 0-5 /HPF    WBC, UA 21-50 (A) None Seen, 0-2, 3-5, 0-5 /HPF    Bacteria, UA Many (A) None Seen, Trace /HPF    Squamous Epithelial Cells, UA Trace None Seen /HPF    Mucous, UA Occasional (A) None Seen /LPF   Pregnancy, urine rapid    Collection Time: 07/20/24  3:51 PM   Result Value Ref Range    hCG Qualitative, Urine Negative  Negative   Urine culture    Collection Time: 07/20/24  3:51 PM    Specimen: Urine   Result Value Ref Range    Urine Culture >/= 100,000 colonies/ml Escherichia coli (A)        Susceptibility    Escherichia coli -  (no method available)     Amox/K Clav 4 Sensitive      Ampicillin 8 Sensitive      Cefepime <=0.12 Sensitive      Ceftriaxone <=0.25 Sensitive      Cefuroxime 4 Sensitive      Ciprofloxacin <=0.25 Sensitive      Gentamicin <=1 Sensitive      Levofloxacin <=0.12 Sensitive      Meropenem <=0.25 Sensitive      Nitrofurantoin <=16 Sensitive      Piperacillin/Tazobactam <=4 Sensitive      Trimethoprim/Sulfamethoxazole <=20 Sensitive      Tetracycline <=1 Sensitive      Tobramycin <=1 Sensitive    COVID/FLU A&B PCR    Collection Time: 07/20/24  5:04 PM   Result Value Ref Range    Influenza A PCR Not Detected Not Detected    Influenza B PCR Not Detected Not Detected    SARS-CoV-2 PCR Not Detected Not Detected, Negative   POCT urine pregnancy    Collection Time: 08/05/24  6:17 AM   Result Value Ref Range    POC Preg Test, Ur Negative Negative     Acceptable Yes    Specimen to Pathology    Collection Time: 08/05/24  9:50 AM   Result Value Ref Range    Case Report       [facility]                            Case: [id number]                                 Authorizing Provider:  Young Stubbs Jr.,   Collected:           08/05/2024 09:50 AM                                 MD                                                                           Ordering Location:     [facility] -       Received:            08/05/2024 12:49 PM                                 [facility]                                                              Pathologist:           Ellie Alfaro MD                                                        Specimen:    Gallbladder, gallbladder                                                                   Clinical Information        "Procedure:  CHOLECYSTECTOMY, LAPAROSCOPIC  Pre-op Diagnosis:  K80.20 - Calculus of gallbladder without cholecystitis without obstruction [ICD-10-CM]  Post-op Diagnosis:  K80.20 - Calculus of gallbladder without cholecystitis without obstruction [ICD-10-CM]      Final Diagnosis         Gallbladder, cholecystectomy:  - Chronic cholecystitis and cholesterolosis.          Microscopic Description       A microscopic examination was performed and the diagnosis reflects the findings.          Gross Description       1. Gallbladder: gallbladder  Received in 10% buffered neutral formalin is a gallbladder measuring 7 x 3.5 x 2.5 cm.  The serosa is smooth and shiny.  The lumen contains dark green bile and no stones.  The wall measures 1 mm in thickness.  The mucosa is bile-tinged with the normal reticulated pattern.  Representative sections submitted in a single cassette.      Report Footnotes       Unless the case is a "gross only" or additional testing only, the final diagnosis for each specimen is based on a microscopic examination of appropriate tissue sections.     POCT urine pregnancy    Collection Time: 12/15/24  7:35 PM   Result Value Ref Range    POC Preg Test, Ur Negative Negative     Acceptable Yes      Imaging Results:     Narrative & Impression  EXAMINATION:  US ABDOMEN LIMITED     CLINICAL HISTORY:  Right upper quadrant abdominal pain.  Sharp pain.  Since this morning     TECHNIQUE:  Multiple real-time transverse and longitudinal sections were performed of the right abdomen by the sonographer. Select images were submitted for review.     FINDINGS:  Hepatic parenchyma is echogenic reflective of steatosis.  There is no delineation of discrete hepatic cystic or solid mass. Hepatic maximum diameter of 13.9 cm.  There is unremarkable hepatopedal flow within the portal vein.  Pancreatic tail is obscured.  Visualized portion of the pancreas is without dominant abnormality.     Gallbladder lumen contains " sludge and are there also small echogenic foci which could represent some small gallstones.  Gallbladder wall thickness is within normal limits. Common bile duct caliber of 5.5 mm is within normal limits for the age. Sonographer reported negative Pascual's sign.     Normally located right kidney length measures 9.1 x 4.8 x 4.7 cm. Right renal corticomedullary differentiation is unremarkable. No evidence of hydronephrosis.     Impression:     1.  Hepatic steatosis.     2.  Mild gallbladder sludge and suspected small gallstones without ultrasound findings of acute cholecystitis.      Electronically signed by:Arnaldo Dillon  Date:                                            07/20/2024  Time:                                           16:51    Assessment/Plan:     Problem List Items Addressed This Visit          GI    Epigastric abdominal pain - Primary     ED visit July 22, 2024 with reports of epigastric abdominal pain with nausea, vomiting, and diarrhea for the past few days.    Physical exam revealed tenderness to epigastric abdominal region without rebound or guarding.    Laboratory results revealed unremarkable CMP; WBC 14.49 and otherwise unremarkable CBC; urinalysis with 2+ nitrites, 250 leukocyte esterase, 21-50 WBC, many bacteria; negative troponin; lipase 22; negative influenza and COVID testing; negative urine pregnancy test.    EKG revealed sinus tachycardia with pulse rate of 123.    Abdominal ultrasound revealed hepatic steatosis and mild gallbladder sludge with suspected small gallstones without ultrasound findings of acute cholecystitis.    She was administered Rocephin IV and discharged home with prescriptions for Bentyl 20 mg 4 times daily for 7 days, Zofran 4 mg every 8 hours as needed for nausea and vomiting, cefdinir 300 mg twice daily for 10 days.   GERD lifestyle modifications  Reflux precautions  Limit NSAID use  EGD  CBC, CMP, lipase  H pylori stool antigen  Continue pantoprazole 40 mg daily in the  morning and okay to take famotidine 20 mg at bedtime as needed  Continue Zofran as needed  Call with updates   Follow-up clinic visit with NP in 6 months (after date of scheduled procedure)   ER precautions provided         Relevant Medications    pantoprazole (PROTONIX) 40 MG tablet    ondansetron (ZOFRAN-ODT) 4 MG TbDL    Other Relevant Orders    CBC Auto Differential    Comprehensive Metabolic Panel    Lipase    Case Request Endoscopy: EGD (ESOPHAGOGASTRODUODENOSCOPY) (Completed)    Helicobacter Pylori Antigen Fecal EIA    Nausea and vomiting     See above         Relevant Medications    pantoprazole (PROTONIX) 40 MG tablet    ondansetron (ZOFRAN-ODT) 4 MG TbDL    Other Relevant Orders    CBC Auto Differential    Comprehensive Metabolic Panel    Lipase    Case Request Endoscopy: EGD (ESOPHAGOGASTRODUODENOSCOPY) (Completed)    Helicobacter Pylori Antigen Fecal EIA    Gastroesophageal reflux disease     See above         Relevant Medications    pantoprazole (PROTONIX) 40 MG tablet    ondansetron (ZOFRAN-ODT) 4 MG TbDL    Other Relevant Orders    CBC Auto Differential    Comprehensive Metabolic Panel    Lipase    Case Request Endoscopy: EGD (ESOPHAGOGASTRODUODENOSCOPY) (Completed)    Helicobacter Pylori Antigen Fecal EIA    Hepatic steatosis     See above  Lifestyle and dietary modifications provided  Recommend good control of comorbidities  Abdominal ultrasound  Fibroscan         Relevant Orders    US Abdomen Limited    US Elastography Liver w/imaging

## 2025-02-05 NOTE — ASSESSMENT & PLAN NOTE
ED visit July 22, 2024 with reports of epigastric abdominal pain with nausea, vomiting, and diarrhea for the past few days.    Physical exam revealed tenderness to epigastric abdominal region without rebound or guarding.    Laboratory results revealed unremarkable CMP; WBC 14.49 and otherwise unremarkable CBC; urinalysis with 2+ nitrites, 250 leukocyte esterase, 21-50 WBC, many bacteria; negative troponin; lipase 22; negative influenza and COVID testing; negative urine pregnancy test.    EKG revealed sinus tachycardia with pulse rate of 123.    Abdominal ultrasound revealed hepatic steatosis and mild gallbladder sludge with suspected small gallstones without ultrasound findings of acute cholecystitis.    She was administered Rocephin IV and discharged home with prescriptions for Bentyl 20 mg 4 times daily for 7 days, Zofran 4 mg every 8 hours as needed for nausea and vomiting, cefdinir 300 mg twice daily for 10 days.   GERD lifestyle modifications  Reflux precautions  Limit NSAID use  EGD  CBC, CMP, lipase  H pylori stool antigen  Continue pantoprazole 40 mg daily in the morning and okay to take famotidine 20 mg at bedtime as needed  Continue Zofran as needed  Call with updates   Follow-up clinic visit with NP in 6 months (after date of scheduled procedure)   ER precautions provided

## 2025-02-05 NOTE — LETTER
February 5, 2025      [facility]  [address] [facility] [address]  Phone: [phone]       Patient: Susan Banerjee   YOB: 1995  Date of Visit: 02/05/2025    To Whom It May Concern:    Jennifer Banerjee  was at [facility] on 02/05/2025. The patient may return to work/school on 02/05/25 with no restrictions. If you have any questions or concerns, or if I can be of further assistance, please do not hesitate to contact me.    Sincerely,    Usha Puente NP

## 2025-02-05 NOTE — ASSESSMENT & PLAN NOTE
See above  Lifestyle and dietary modifications provided  Recommend good control of comorbidities  Abdominal ultrasound  Fibroscan

## 2025-02-06 ENCOUNTER — TELEPHONE (OUTPATIENT)
Dept: GASTROENTEROLOGY | Facility: CLINIC | Age: 30
End: 2025-02-06
Payer: MEDICAID

## 2025-02-06 NOTE — TELEPHONE ENCOUNTER
----- Message from Alyx sent at 2/6/2025 12:42 PM CST -----  Regarding: lab results  Pt called to speak to nurse about lab results.   [phone]

## 2025-02-07 ENCOUNTER — HOSPITAL ENCOUNTER (EMERGENCY)
Facility: HOSPITAL | Age: 30
Discharge: HOME | End: 2025-02-07
Attending: EMERGENCY MEDICINE
Payer: MEDICAID

## 2025-02-07 VITALS
BODY MASS INDEX: 33.18 KG/M2 | SYSTOLIC BLOOD PRESSURE: 124 MMHG | TEMPERATURE: 98 F | DIASTOLIC BLOOD PRESSURE: 75 MMHG | HEART RATE: 73 BPM | WEIGHT: 169 LBS | HEIGHT: 60 IN | OXYGEN SATURATION: 100 % | RESPIRATION RATE: 16 BRPM

## 2025-02-07 DIAGNOSIS — S93.402A SPRAIN OF LEFT ANKLE, UNSPECIFIED LIGAMENT, INITIAL ENCOUNTER: Primary | ICD-10-CM
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
## 2025-02-07 PROCEDURE — 99283 EMERGENCY DEPT VISIT LOW MDM: CPT | Mod: 25

## 2025-02-07 PROCEDURE — 25000003 PHARM REV CODE 250: Performed by: EMERGENCY MEDICINE

## 2025-02-07 RX ORDER — IBUPROFEN 600 MG/1
600 TABLET ORAL
Status: COMPLETED | OUTPATIENT
Start: 2025-02-07 | End: 2025-02-07

## 2025-02-07 RX ORDER — HYDROCODONE BITARTRATE AND ACETAMINOPHEN 5; 325 MG/1; MG/1
1 TABLET ORAL EVERY 6 HOURS PRN
Qty: 12 TABLET | Refills: 0 | Status: SHIPPED | OUTPATIENT
Start: 2025-02-07

## 2025-02-07 RX ORDER — IBUPROFEN 600 MG/1
600 TABLET ORAL EVERY 6 HOURS PRN
Qty: 20 TABLET | Refills: 0 | Status: SHIPPED | OUTPATIENT
Start: 2025-02-07

## 2025-02-07 RX ADMIN — IBUPROFEN 600 MG: 600 TABLET, FILM COATED ORAL at 07:02

## 2025-02-07 NOTE — ED PROVIDER NOTES
Encounter Date: 2/7/2025       History     Chief Complaint   Patient presents with    Ankle Pain     Pt c/o pain to left ankle s/p trip and fall this morning.     29-year-old female states that she tripped and fall this morning and has left ankle pain which has shooting pain down into her foot.  She is unable to bear weight and states that the pain is severe.  She denies any pain to the lower leg knee hip or any other area.  She has no laceration or abrasion.  She denies any previous injury to this area.    The history is provided by the patient and the spouse.     Review of patient's allergies indicates:  No Known Allergies  Past Medical History:   Diagnosis Date    Asthma     GERD (gastroesophageal reflux disease) 1994    Migraine headache without aura      Past Surgical History:   Procedure Laterality Date    APPENDECTOMY      CHOLECYSTECTOMY  08/05/25    KNEE SURGERY      LAPAROSCOPIC CHOLECYSTECTOMY N/A 08/05/2024    Procedure: CHOLECYSTECTOMY, LAPAROSCOPIC;  Surgeon: Young Stubbs Jr., MD;  Location: [facility];  Service: General;  Laterality: N/A;     Family History   Problem Relation Name Age of Onset    Diabetes Maternal Grandfather      Lung cancer Mother       Social History     Tobacco Use    Smoking status: Never     Passive exposure: Never    Smokeless tobacco: Never   Substance Use Topics    Alcohol use: Yes    Drug use: Never     Review of Systems   Musculoskeletal:  Positive for arthralgias.   All other systems reviewed and are negative.      Physical Exam     Initial Vitals [02/07/25 0721]   BP Pulse Resp Temp SpO2   119/73 78 20 97.5 °F (36.4 °C) 98 %      MAP       --         Physical Exam    Nursing note and vitals reviewed.  Constitutional: She appears well-developed and well-nourished. She is not diaphoretic. No distress.   HENT:   Head: Normocephalic and atraumatic. Mouth/Throat: Oropharynx is clear and moist.   Eyes: Conjunctivae are normal. Pupils are equal, round, and reactive to  light.   Pulmonary/Chest: No respiratory distress. She has no wheezes. She has no rhonchi. She has no rales.   Abdominal: She exhibits no distension. There is no abdominal tenderness. There is no guarding.   Musculoskeletal:         General: No tenderness or edema.      Comments: Left ankle has moderate swelling over the distal fibula.  No laceration, abrasion, bruising visible.  No deformity.  Decreased range of motion due to pain.  Normal appearance of left foot.  2+ pedal pulse.  Brisk capillary refill to the toes with normal sensation throughout.  No tenderness to the knee, lower leg, foot.  No tenderness to the medial ankle.     Neurological: She is alert and oriented to person, place, and time.   Skin: Skin is warm and dry. Capillary refill takes less than 2 seconds. No rash noted.   Psychiatric: She has a normal mood and affect. Thought content normal.         ED Course   Procedures  Labs Reviewed - No data to display       Imaging Results              X-Ray Foot Complete Left (Final result)  Result time 02/07/25 07:51:26      Final result by Nessa De Santiago MD (02/07/25 07:51:26)                   Impression:      No acute bony abnormality.      Electronically signed by: Nessa De Santiago  Date:    02/07/2025  Time:    07:51               Narrative:    EXAMINATION:  XR FOOT COMPLETE 3 VIEW LEFT    CLINICAL HISTORY:  Unspecified fall, initial encounter    COMPARISON:  None.    FINDINGS:  There is no acute fracture or malalignment.  The soft tissues are unremarkable.                                       X-Ray Ankle Complete Left (Final result)  Result time 02/07/25 07:50:43      Final result by Nessa De Santiago MD (02/07/25 07:50:43)                   Impression:      No acute bony abnormality.      Electronically signed by: Nessa De Santiago  Date:    02/07/2025  Time:    07:50               Narrative:    EXAMINATION:  XR ANKLE COMPLETE 3 VIEW LEFT    CLINICAL  HISTORY:  fall;    COMPARISON:  None.    FINDINGS:  There is no acute fracture or malalignment.  There is soft tissue swelling over the lateral ankle.                                       Medications   ibuprofen tablet 600 mg (600 mg Oral Given 2/7/25 0741)     Medical Decision Making  See HPI for narrative    Differential diagnosis includes but is not limited to ankle sprain, ankle fracture, contusion, foot sprain, foot fracture, lower leg injury    Problems Addressed:  Sprain of left ankle, unspecified ligament, initial encounter:     Details: Patient was seen and evaluated in the emergency department due to injury to her left ankle.   I discussed with the patient that the radiologist will read the x-ray later in the day.  She was placed in a walking boot and will stay home this weekend.  Discussed with her to rest, ice, elevate her ankle.  She plans to return to work on Monday and if her ankle is hurting too much to return to work, she will see her primary care doctor.    Amount and/or Complexity of Data Reviewed  Radiology: ordered and independent interpretation performed.    Risk  Prescription drug management.  Risk Details: Pros, cons, adverse effects and risk of addiction from opioid analgesics was discussed and patient still wants the prescription.                                      Clinical Impression:  Final diagnoses:  [W19.XXXA] Fall  [S93.402A] Sprain of left ankle, unspecified ligament, initial encounter (Primary)          ED Disposition Condition    Discharge Stable          ED Prescriptions       Medication Sig Dispense Start Date End Date Auth. Provider    ibuprofen (ADVIL,MOTRIN) 600 MG tablet Take 1 tablet (600 mg total) by mouth every 6 (six) hours as needed for Pain. 20 tablet 2/7/2025 -- Marisol Polk MD    HYDROcodone-acetaminophen (NORCO) 5-325 mg per tablet Take 1 tablet by mouth every 6 (six) hours as needed for Pain. 12 tablet 2/7/2025 -- Marisol Polk MD           Follow-up Information    None          Marisol Polk MD  02/07/25 0876

## 2025-02-07 NOTE — Clinical Note
"Susan"Amber Banerjee was seen and treated in our emergency department on 2/7/2025.  She may return to work on 02/10/2025.       If you have any questions or concerns, please don't hesitate to call.      Marisol Polk MD"

## 2025-02-11 ENCOUNTER — HOSPITAL ENCOUNTER (OUTPATIENT)
Dept: RADIOLOGY | Facility: HOSPITAL | Age: 30
Discharge: HOME | End: 2025-02-11
Attending: NURSE PRACTITIONER
Payer: MEDICAID

## 2025-02-11 DIAGNOSIS — K76.0 HEPATIC STEATOSIS: ICD-10-CM

## 2025-02-11 PROCEDURE — 76705 ECHO EXAM OF ABDOMEN: CPT | Mod: TC

## 2025-02-17 DIAGNOSIS — S93.402A SPRAIN OF UNSPECIFIED LIGAMENT OF LEFT ANKLE, INITIAL ENCOUNTER: Primary | ICD-10-CM

## 2025-02-18 DIAGNOSIS — S93.402A SPRAIN OF UNSPECIFIED LIGAMENT OF LEFT ANKLE, INITIAL ENCOUNTER: Primary | ICD-10-CM

## 2025-02-26 ENCOUNTER — OFFICE VISIT (OUTPATIENT)
Dept: ORTHOPEDICS | Facility: CLINIC | Age: 30
End: 2025-02-26
Payer: MEDICAID

## 2025-02-26 ENCOUNTER — HOSPITAL ENCOUNTER (OUTPATIENT)
Dept: RADIOLOGY | Facility: HOSPITAL | Age: 30
Discharge: HOME | End: 2025-02-26
Payer: MEDICAID

## 2025-02-26 VITALS
SYSTOLIC BLOOD PRESSURE: 98 MMHG | BODY MASS INDEX: 33.18 KG/M2 | OXYGEN SATURATION: 97 % | HEIGHT: 60 IN | DIASTOLIC BLOOD PRESSURE: 64 MMHG | HEART RATE: 77 BPM | WEIGHT: 169 LBS

## 2025-02-26 DIAGNOSIS — M25.572 LEFT ANKLE PAIN, UNSPECIFIED CHRONICITY: ICD-10-CM

## 2025-02-26 DIAGNOSIS — S93.402A SPRAIN OF LEFT ANKLE, UNSPECIFIED LIGAMENT, INITIAL ENCOUNTER: Primary | ICD-10-CM

## 2025-02-26 PROCEDURE — 73610 X-RAY EXAM OF ANKLE: CPT | Mod: TC,LT

## 2025-02-26 PROCEDURE — 99213 OFFICE O/P EST LOW 20 MIN: CPT | Mod: PBBFAC,25

## 2025-02-26 RX ORDER — MELOXICAM 7.5 MG/1
7.5 TABLET ORAL 2 TIMES DAILY PRN
Qty: 60 TABLET | Refills: 1 | Status: SHIPPED | OUTPATIENT
Start: 2025-02-26 | End: 2025-03-28

## 2025-02-26 NOTE — PROGRESS NOTES
Subjective:   Patient ID: Susan Banerjee is a 29 y.o. female who presented to [facility] for new visit.    Chief Complaint: Injury of the Left Ankle    History of Present Illness:  Susan Banerjee presents to the clinic with left ankle pain after an inversion injury, 2wks and 5 days ago, DOI: 2/7/25. She continues to have acing pain with intermittent sharp pain with certain movements to the anterior, lateral and medial left ankle pain. She has been unable to bear weight to the left ankle when not in the walking boot. She had initial swelling after the injury which has slowly reduced over the last few weeks. She explains that she was stepping into her truck and stepped on a tree root that resulted in her rolling her ankle. She was evaluated in the ED on the day of injury due to inability to put weight on the left LE. An XR was completed then placed in walking boot. She also had an MRI completed as well. Patient has had no prior ankle problems. Evaluation to date: plain films, MRI, and ER evaluation. Treatment to date: avoidance of activity, bracing, oral analgesics, and ice/heat. Expectations for today's visit: further evaluation.  Occupation: . PCP: LUBA Posadas.    Ankle/Foot Review of Systems:  Swelling?  yes  Instability?  no  Mechanical sx?  no  <30 min AM stiffness? no  Limited ROM? yes  Fever/Chills? no    Objective:     Physical Exam:  BP 98/64   Pulse 77   Ht 5' (1.524 m)   Wt 76.7 kg (169 lb)   LMP 02/04/2025   SpO2 97%   BMI 33.01 kg/m²     Appearance:  limping - FWB with walking boot  Soft tissue swelling: Left: yes Right: no  Effusion: Left:  Negative Right: Negative  Erythema: Left no Right: no  Ecchymosis: Left: no Right: no  Atrophy: Left: no Right: no    Palpation:  Ankle/Foot Tenderness: Left: posterior medial malleolus, posterior lateral  malleolus, anterior talofibular ligament (ATFL), and  calcaneofibular ligament (CFL) Right: non tender.    Range of motion:  Ankle dorsiflexion (20 degrees):     Left: 20 Right: 20  Ankle Plantar flexion (50 degrees): Left 50 Right: 50  Subtalar inversion (5 degrees): Left: 5 Right 5  Subtalar eversion (5 degrees):  Left: 5 Right 5  Transverse/midtarsal: adduction (20 degrees): Left: 20 Right: 20  Transverse/midtarsal abduction (10 degrees): Left: 10 Right: 10    Strength:  Foot inversion/dorsiflexion (Deep Peroneal N. L4):Left: 4/5  Pain: yes Right: 5/5  Pain: no  Foot plantarflexion (Tibial N. S1): Left: 4/5  Pain: yes Right: 5/5  Pain: no  Foot eversion (superficial peroneal L4-S1): Left: 4/5  Pain: yes Right: 5/5  Pain: no    Special Tests:  Sepulveda:  Left: Not performed  Right: Not performed   Anterior drawer: Left: Negative    Right: Negative   Talar tilt: Left: Negative     Right: Negative   External rotation stress (Kleiger's): Left: Negative  Right: Negative  Eversion stress: Left: Negative Right: Negative   Squeeze: Left: Negative  Right: Negative  Heel rise: Left: Not performed Right: Not performed  Too many toes sign: Left: Not performed Right: Not performed    General appearance: NAD  Peripheral pulses: normal bilaterally   Reflexes: Left: normal Right normal   Sensation: normal    Labs:  Last A1c: The patient doesn't have any registry metric data available     Imaging:   Previous images reviewed.  X-rays ordered and performed today: yes  # of views: 3 Laterality: left  My Interpretation:  no abnormality. No obvious fracture, abnormal joint space widening/narrowing, cortical defect or obvious soft tissue injury.    MRI Left Ankle: ATFL and CFL complete ivis, partial tear deltoid ligament, PFL is intact     Assessment:     Encounter Diagnoses   Code Name Primary?    S93.402A Sprain of left ankle, unspecified ligament, initial encounter Yes       Plan:     MDM: Prior external referring provider notes reviewed. Prior external referring provider studies  reviewed.   Dx: Left Ankle Sprain with ATFL & CFL complete tears, partial deltoid ligament tear - Acute new problem  Treatment Plan: Discussed with patient diagnosis, prognosis, and treatment recommendations. Discussed with the patient that this meets non-surgical management criteria. She has been in a walking boot for 2 weeks and 5 days at this point and would like to transition her out of the boot to a lace up ankle brace over the next week. Will have the patient begin formal PT, PT script given to patient at visit today. Follow up in 3-4 weeks.   Imaging: radiological studies ordered and independently reviewed; discussed with patient; pending radiologist interpretation.   Weight Management: is paramount. Recommend at least 10% of total body weight loss if your BMI is 30-34.9. A BMI of <24.9 may provide further relief..   Activity: Activity as tolerated; HEP to include aerobic conditioning and strength training with non-painful activity. ROM/STG exercises. Proper footware; assistive devises to avoid limping.   Therapy: Physical Therapy  Medication: START meloxicam (Mobic 15 mg daily). Please see your primary care physician for further refills.  RTC: 3-4 weeks.      This note is dictated using the M*Modal Fluency Direct word recognition program. There are word recognition mistakes that are occasionally missed on review.     Clemencia Woods MD  Sports Medicine Fellow

## 2025-02-26 NOTE — LETTER
2025  Susan Banerjee     [facility]  [address] [address]  Phone: [phone]   [facility]   [address], [address]  Phone: [phone]  Fax: [phone]    Name:Susan Banerjee  :1995   Date:2025     PATIENT IS ABLE TO RETURN TO WORK AS OF:2024    [X] SEDENTARY WORK: Lifting 10 pounds maximum and occasionally lifting and/or carrying articles such as dockers, ledgers and small tools.  Although a sedentary job is defined as one which involved sitting, a certain amount of walking and standing are required only occasionally and other sedentary criteria are met.    [_] LIGHT WORK: Lifting 20 pounds with frequent lifting and/or carrying objects weighing up to 10 pounds.  Even though the weight lifted may be only a negotiable amount, a job is in the category when it involves sitting most of the time with a degree of pushing/pulling of arm and/or leg controls.    [_] MEDIUM WORK: Lifting of 50 pounds maximum with frequent lifting and/or carrying of objects up to 25 pounds.    [_] HEAVY WORK: Lifting of 100 pounds maximum with frequent lifting and/or carrying objects up to 50 pounds.    [_] VERY HEAVY WORK: Lifting objects in excess of 100 pounds with frequent lifting and/or carrying of objects weighing 50 pounds or more.    [_] REGULAR DUTY: [_] No Restrictions. [_] With Restrictions (See comments below0:    COMMENTS- Weight-bearing as tolerated until next appointment in 3-4 weeks.

## 2025-02-28 NOTE — PROGRESS NOTES
Faculty Attestation: Susan Banerjee  was seen in [facility] Medicine Clinic Discussed with Dr. Woods at the time of the visit. History of Present Illness, Physical Exam, and Assessment and Plan reviewed.     Treatment plan is reasonable and appropriate. Compliance with treatment recommendations is important.      Radiology images independently reviewed and agree with fellow interpretation.     No procedure was performed.    Dustin Olivas MD  Sports Medicine

## 2025-03-31 ENCOUNTER — OFFICE VISIT (OUTPATIENT)
Dept: ORTHOPEDICS | Facility: CLINIC | Age: 30
End: 2025-03-31
Payer: MEDICAID

## 2025-03-31 VITALS
DIASTOLIC BLOOD PRESSURE: 67 MMHG | HEIGHT: 60 IN | WEIGHT: 180.19 LBS | OXYGEN SATURATION: 97 % | TEMPERATURE: 98 F | HEART RATE: 84 BPM | RESPIRATION RATE: 17 BRPM | BODY MASS INDEX: 35.37 KG/M2 | SYSTOLIC BLOOD PRESSURE: 97 MMHG

## 2025-03-31 DIAGNOSIS — S93.402D SPRAIN OF LEFT ANKLE, UNSPECIFIED LIGAMENT, SUBSEQUENT ENCOUNTER: Primary | ICD-10-CM

## 2025-03-31 PROCEDURE — 99214 OFFICE O/P EST MOD 30 MIN: CPT | Mod: PBBFAC

## 2025-03-31 RX ORDER — LORATADINE 10 MG/1
1 TABLET ORAL DAILY
COMMUNITY
Start: 2025-02-10

## 2025-03-31 RX ORDER — ONDANSETRON 4 MG/1
TABLET, FILM COATED ORAL
COMMUNITY

## 2025-03-31 NOTE — PROGRESS NOTES
Subjective:   Patient ID: Susan Banerjee is a 29 y.o. female who presented to [facility] for follow up.    Chief Complaint: Injury of the Left Ankle    History of Present Illness:  Susan Banerjee presents to the clinic for follow up of left ankle sprain after an inversion injury, DOI: 2/7/25. A the last visit patient was transitioned from walking boot to lace up ankle brace over a weeks time. Pain is located anterior, medial, and lateral ankle pain, and rated 7/10. Quality of pain is described as Dull, Aching, and Throbbing. Inciting event: inversion injury where she stepped a tree root. Pain is aggravated by any weight bearing, standing, and walking. Patient has had no prior ankle problems. Evaluation to date: plain films, MRI, and ER evaluation. Treatment to date: avoidance of activity, bracing, oral analgesics, and ice/heat. Expectations for today's visit: further evaluation.  Occupation: . PCP: LUBA Posadas.     Ankle/Foot Review of Systems:  Swelling?  no  Instability?  no  Mechanical sx?  no  <30 min AM stiffness? no  Limited ROM? yes  Fever/Chills? no    Objective:     Physical Exam:  BP 97/67   Pulse 84   Temp 98.2 °F (36.8 °C) (Oral)   Resp 17   Ht 5' (1.524 m)   Wt 81.7 kg (180 lb 3.2 oz)   SpO2 97%   BMI 35.19 kg/m²     Appearance:  Normal gait/station - FWB  Soft tissue swelling: Left: no Right: no  Effusion: Left:  Negative Right: Negative  Erythema: Left no Right: no  Ecchymosis: Left: no Right: no  Atrophy: Left: no Right: no    Palpation:  Ankle/Foot Tenderness: Left: posterior medial malleolus, posterior lateral  malleolus, and anterior talofibular ligament (ATFL) Right: non tender.    Range of motion:  Ankle dorsiflexion (20 degrees):     Left: 20 Right: 20  Ankle Plantar flexion (50 degrees): Left 50 Right: 50  Subtalar inversion (5 degrees): Left: 5 Right 5  Subtalar eversion (5 degrees):  Left: 5  Right 5  Transverse/midtarsal: adduction (20 degrees): Left: 20 Right: 20  Transverse/midtarsal abduction (10 degrees): Left: 10 Right: 10    Strength:  Foot inversion/dorsiflexion (Deep Peroneal N. L4):Left: 5/5  Pain: yes Right: 5/5  Pain: no  1st toe Dorsiflexion (Deep Peroneal N. L5): Left: 5/5  Pain: no Right: 5/5  Pain: no  Foot plantarflexion (Tibial N. S1): Left: 4/5  Pain: yes Right: 5/5  Pain: no  Foot eversion (superficial peroneal L4-S1): Left: 4/5  Pain: yes Right: 5/5  Pain: no    Special Tests:  Sepulveda:  Left: Not performed  Right: Not performed   Anterior drawer: Left: Negative    Right: Negative   Talar tilt: Left: Negative     Right: Negative   Eversion stress: Left: Negative Right: Negative   Squeeze: Left: Negative  Right: Negative  Heel rise: Left: Not performed Right: Not performed  Too many toes sign: Left: Not performed Right: Not performed    General appearance: NAD  Peripheral pulses: normal bilaterally   Reflexes: Left: normal Right normal   Sensation: normal    Labs:  Last A1c: The patient doesn't have any registry metric data available     Imaging:   Previous images reviewed.  X-rays ordered and performed today: no      Assessment:     Encounter Diagnoses   Code Name Primary?    S93.402D Sprain of left ankle, unspecified ligament, subsequent encounter Yes       Plan:     Dx: Left Ankle Sprain with ATFL & CFL complete tears, partial deltoid ligament tear, DOI: 2/7/25 -  appropriate progression  Treatment Plan: Discussed with patient diagnosis, prognosis, and treatment recommendations.  Discussed with the patient continue nonsurgical management criteria.  Had recommended the patient transitioned out of the walking boot at her last visit, she is still wearing it at work and when going up the stairs.  She did not feel as supported in the lace-up ankle brace and felt the brace was slightly too big, she does have a smaller foot wearing a child size 5.  She has been compliant with going to  formal PT and has gone to about 5 sessions since her last visit.  Recommend the patient to transition to lace-up ankle brace, she was fitted for a smaller lace-up ankle brace at today's visit. Work note for the patient given today explaining the gradual transition out of the boot over the next week as tolerated. Follow up in 4 weeks after her vacation to [place], discussed supportive shoes and ankle brace wear while on vacation.   Imaging: prior radiological studies independently reviewed; discussed with patient; agree with radiologist interpretation.   Weight Management: is paramount. Recommend to discuss with PCP about medication and bariatric surgery options for weight loss if your BMI is >35 and applicable. A BMI of <24.9 may provide further relief..   Activity: Activity as tolerated; HEP to include aerobic conditioning and strength training with non-painful activity. ROM/STG exercises. Proper footware; assistive devises to avoid limping.   Therapy: Physical Therapy  Medication: CONTINUE previously prescribed medication Meloxicam  CONTINUE over-the-counter acetaminophen (Tylenol 1000 mg three times per day as needed). Please see your primary care physician for further refills.  RTC: 1 month.      This note is dictated using the M*Modal Fluency Direct word recognition program. There are word recognition mistakes that are occasionally missed on review.     Clemencia Woods MD  Sports Medicine Fellow

## 2025-03-31 NOTE — LETTER
[facility]   [address] [address]  Phone: [phone]  Fax: [phone]    Name:Susan Banerjee  :1995   Date:2025     PATIENT IS UNABLE TO WORK AS OF:  [] Pending treatment.  [] For approximately [] Days [] Weeks [] Months  [] Pending diagnostic testing.  [] Pending surgical treatment.  [] For approximately _ months (Post Surgery)    PATIENT IS ABLE TO RETURN TO WORK AS OF: 3/31/2025    [] SEDENTARY WORK: Lifting 10 pounds maximum and occasionally lifting and/or carrying articles such as dockers, ledgers and small tools.  Although a sedentary job is defined as one which involved sitting, a certain amount of walking and standing are required only occasionally and other sedentary criteria are met.    [x] LIGHT WORK: Lifting 20 pounds with frequent lifting and/or carrying objects weighing up to 10 pounds.  Even though the weight lifted may be only a negotiable amount, a job is in the category when it involves sitting most of the time with a degree of pushing/pulling of arm and/or leg controls.    [] MEDIUM WORK: Lifting of 50 pounds maximum with frequent lifting and/or carrying of objects up to 25 pounds.    [] HEAVY WORK: Lifting of 100 pounds maximum with frequent lifting and/or carrying objects up to 50 pounds.    [] VERY HEAVY WORK: Lifting objects in excess of 100 pounds with frequent lifting and/or carrying of objects weighing 50 pounds or more.    [] REGULAR DUTY: [] No Restrictions. [] With Restrictions (See comments below0:    COMMENTS Must wear boot one hour on one hour off while she transitions to regular shoe, for the next  10 days    Clemencia Woods MD  2025

## 2025-03-31 NOTE — LETTER
[facility]   [address] [address]  Phone: [phone]  Fax: [phone]    Name:Susan Banerjee  :1995   Date:2025     PATIENT IS UNABLE TO WORK AS OF:  [] Pending treatment.  [] For approximately [] Days [] Weeks [] Months  [] Pending diagnostic testing.  [] Pending surgical treatment.  [] For approximately _ months (Post Surgery)    PATIENT IS ABLE TO RETURN TO WORK AS OF: 3/31/2025    [] SEDENTARY WORK: Lifting 10 pounds maximum and occasionally lifting and/or carrying articles such as dockers, ledgers and small tools.  Although a sedentary job is defined as one which involved sitting, a certain amount of walking and standing are required only occasionally and other sedentary criteria are met.    [x] LIGHT WORK: Lifting 20 pounds with frequent lifting and/or carrying objects weighing up to 10 pounds.  Even though the weight lifted may be only a negotiable amount, a job is in the category when it involves sitting most of the time with a degree of pushing/pulling of arm and/or leg controls.    [] MEDIUM WORK: Lifting of 50 pounds maximum with frequent lifting and/or carrying of objects up to 25 pounds.    [] HEAVY WORK: Lifting of 100 pounds maximum with frequent lifting and/or carrying objects up to 50 pounds.    [] VERY HEAVY WORK: Lifting objects in excess of 100 pounds with frequent lifting and/or carrying of objects weighing 50 pounds or more.    [] REGULAR DUTY: [] No Restrictions. [] With Restrictions (See comments below0:    COMMENTS Must wear boot one hour on, one hour off while she transitions to regular show with brace, for the next 10 days.    Clemencia Woods MD  2025

## 2025-04-02 NOTE — PROGRESS NOTES
Faculty Attestation: Susan Banerjee  was seen in [facility] Medicine Clinic Discussed with Dr. Woods at the time of the visit. History of Present Illness, Physical Exam, and Assessment and Plan reviewed.     Treatment plan is reasonable and appropriate. Compliance with treatment recommendations is important.      No imaging studies were done today.     No procedure was performed.    Dustin Olivas MD  Sports Medicine

## 2025-04-25 ENCOUNTER — PROCEDURE VISIT (OUTPATIENT)
Dept: GASTROENTEROLOGY | Facility: CLINIC | Age: 30
End: 2025-04-25
Payer: MEDICAID

## 2025-04-25 DIAGNOSIS — K76.0 HEPATIC STEATOSIS: Primary | ICD-10-CM

## 2025-04-25 NOTE — PROCEDURES
Fibroscan Procedure     Name: Susan Banerjee  Date of Procedure : 2025  Interpreting Physician: Usha Puente NP  Diagnosis: MASLD (NAFLD)    Probe: M    Fibroscan readin.5 kPa    Fibrosis: F 0-1     CAP readin dB/m    Steatosis: S0      Miscellaneous:       Repeat: 1 year

## 2025-04-27 NOTE — PROGRESS NOTES
"Subjective:   Patient ID: Susan Banerjee is a 30 y.o. female who presented to [facility] for follow up.    Chief Complaint: Pain of the Left Ankle    History of Present Illness:  Susan Banerjee presents to the clinic today after a left ankle sprain after an inversion injury, 4wks ago, DOI: 2/7/25. Pain is located posterior medial malleolus and anterior ankle, and rated 10/10. Quality of pain is described as Sharp and Aching. Inciting event: inversion injury where she stepped on a tree root. Pain is aggravated by any weight bearing and lateral movements.  She has been compliant with lace-up ankle brace since her last visit.  Patient has had no prior ankle problems. Evaluation to date: plain films, MRI, and ER evaluation. Treatment to date: avoidance of activity, bracing, oral analgesics, and ice/heat. Expectations for today's visit: further evaluation. Occupation: . PCP: LUBA Posadas.     Ankle/Foot Review of Systems:  Swelling?  yes  Instability?  no  Mechanical sx?  no  <30 min AM stiffness? no  Limited ROM? yes  Fever/Chills? no    Objective:     Physical Exam:  /71   Pulse 87   Ht 5' 1" (1.549 m)   Wt 82.1 kg (181 lb)   BMI 34.20 kg/m²     Appearance:  Normal gait/station - FWB  Soft tissue swelling: Left: no Right: no  Effusion: Left:  Negative Right: Negative  Erythema: Left no Right: no  Ecchymosis: Left: no Right: no  Atrophy: Left: no Right: no    Palpation:  Ankle/Foot Tenderness: Left: posterior medial malleolus, anterior talofibular ligament (ATFL), and calcaneofibular ligament (CFL) Right: non tender.    Range of motion:  Ankle dorsiflexion (20 degrees):     Left: 20 Right: 20  Ankle Plantar flexion (50 degrees): Left 50 Right: 50  Subtalar inversion (5 degrees): Left: 5 Right 5  Subtalar eversion (5 degrees):  Left: 5 Right 5  Transverse/midtarsal: adduction (20 degrees): Left: 20 Right: " 20  Transverse/midtarsal abduction (10 degrees): Left: 10 Right: 10    Strength:  Foot inversion/dorsiflexion (Deep Peroneal N. L4):Left: 4/5  Pain: yes Right: 5/5  Pain: no  1st toe Dorsiflexion (Deep Peroneal N. L5): Left: 5/5  Pain: no Right: 5/5  Pain: no  Foot plantarflexion (Tibial N. S1): Left: 5/5  Pain: yes Right: 5/5  Pain: no  Foot eversion (superficial peroneal L4-S1): Left: 4/5  Pain: yes Right: 5/5  Pain: no    Special Tests:  Sepulveda:  Left: Not performed  Right: Not performed   Anterior drawer: Left: Negative    Right: Negative   Talar tilt: Left: Negative     Right: Negative   External rotation stress (Kleiger's): Left: Negative  Right: Negative  Eversion stress: Left: Negative Right: Negative   Squeeze: Left: Negative  Right: Negative  Heel rise: Left: Not performed Right: Not performed  Too many toes sign: Left: Not performed Right: Not performed    General appearance: NAD  Peripheral pulses: normal bilaterally   Reflexes: Left: normal Right normal   Sensation: normal    Labs:  Last A1c: The patient doesn't have any registry metric data available     Imaging:   Previous images reviewed.  X-rays ordered and performed today: no     MRI Left Ankle: ATFL and CFL complete ivis, partial tear deltoid ligament, PFL is intact     Assessment:     Encounter Diagnoses   Code Name Primary?    S93.402D Sprain of left ankle, unspecified ligament, subsequent encounter Yes       Plan:     Dx: Left Ankle Sprain with ATFL & CFL complete tears, partial deltoid ligament tear, DOI: 2/7/25 -  appropriate progression   Treatment Plan: Discussed with patient diagnosis, prognosis, and treatment recommendations. Discussed with the patient continue nonsurgical management criteria. Last visit she was recommended to transition out of her walking boot into a lace up ankle brace compliant with.  We will continue the patient with lace-up ankle brace.  She has been compliant with going to formal PT. We will have the patient  follow up in 4 weeks.  Imaging: prior radiological studies independently reviewed; discussed with patient; agree with radiologist interpretation.   Weight Management: is paramount. Recommend to discuss with PCP about medication and bariatric surgery options for weight loss if your BMI is >35 and applicable. A BMI of <24.9 may provide further relief..   Activity: Activity as tolerated; HEP to include aerobic conditioning and strength training with non-painful activity. ROM/STG exercises. Proper footware; assistive devises to avoid limping.   Therapy: Physical Therapy  Medication: CONTINUE over-the-counter acetaminophen (Tylenol 1000 mg three times per day as needed)  CONTINUE over-the-counter NSAIDs (ibuprofen 200mg three tablets three times a day as needed). Please see your primary care physician for further refills.  RTC: 4wks for follow up.        This note is dictated using the M*Modal Fluency Direct word recognition program. There are word recognition mistakes that are occasionally missed on review.     Clemencia Woods MD  Sports Medicine Fellow

## 2025-04-28 ENCOUNTER — OFFICE VISIT (OUTPATIENT)
Dept: ORTHOPEDICS | Facility: CLINIC | Age: 30
End: 2025-04-28
Payer: MEDICAID

## 2025-04-28 ENCOUNTER — HOSPITAL ENCOUNTER (EMERGENCY)
Facility: HOSPITAL | Age: 30
Discharge: HOME | End: 2025-04-28
Attending: INTERNAL MEDICINE
Payer: MEDICAID

## 2025-04-28 VITALS
TEMPERATURE: 98 F | HEART RATE: 96 BPM | RESPIRATION RATE: 16 BRPM | BODY MASS INDEX: 34.17 KG/M2 | OXYGEN SATURATION: 99 % | HEIGHT: 61 IN | SYSTOLIC BLOOD PRESSURE: 114 MMHG | WEIGHT: 181 LBS | DIASTOLIC BLOOD PRESSURE: 72 MMHG

## 2025-04-28 VITALS
DIASTOLIC BLOOD PRESSURE: 71 MMHG | SYSTOLIC BLOOD PRESSURE: 108 MMHG | HEIGHT: 61 IN | BODY MASS INDEX: 34.17 KG/M2 | HEART RATE: 87 BPM | WEIGHT: 181 LBS

## 2025-04-28 DIAGNOSIS — S93.402D SPRAIN OF LEFT ANKLE, UNSPECIFIED LIGAMENT, SUBSEQUENT ENCOUNTER: Primary | ICD-10-CM

## 2025-04-28 DIAGNOSIS — K21.9 GASTROESOPHAGEAL REFLUX DISEASE, UNSPECIFIED WHETHER ESOPHAGITIS PRESENT: Primary | ICD-10-CM

## 2025-04-28 LAB
B-HCG UR QL: NEGATIVE
BACTERIA #/AREA URNS AUTO: ABNORMAL /HPF
BILIRUB UR QL STRIP.AUTO: NEGATIVE
CLARITY UR: CLEAR
COLOR UR AUTO: ABNORMAL
CTP QC/QA: YES
GLUCOSE UR QL STRIP: NORMAL
HGB UR QL STRIP: NEGATIVE
HYALINE CASTS #/AREA URNS LPF: ABNORMAL /LPF
KETONES UR QL STRIP: NEGATIVE
LEUKOCYTE ESTERASE UR QL STRIP: 500
MUCOUS THREADS URNS QL MICRO: ABNORMAL /LPF
NITRITE UR QL STRIP: NEGATIVE
PH UR STRIP: 6 [PH]
PROT UR QL STRIP: NEGATIVE
RBC #/AREA URNS AUTO: ABNORMAL /HPF
SP GR UR STRIP.AUTO: 1.02 (ref 1–1.03)
SQUAMOUS #/AREA URNS LPF: ABNORMAL /HPF
UROBILINOGEN UR STRIP-ACNC: NORMAL
WBC #/AREA URNS AUTO: ABNORMAL /HPF

## 2025-04-28 PROCEDURE — 87086 URINE CULTURE/COLONY COUNT: CPT | Performed by: INTERNAL MEDICINE

## 2025-04-28 PROCEDURE — 81025 URINE PREGNANCY TEST: CPT | Performed by: INTERNAL MEDICINE

## 2025-04-28 PROCEDURE — 99284 EMERGENCY DEPT VISIT MOD MDM: CPT | Mod: 27

## 2025-04-28 PROCEDURE — 99213 OFFICE O/P EST LOW 20 MIN: CPT | Mod: PBBFAC

## 2025-04-28 PROCEDURE — 81001 URINALYSIS AUTO W/SCOPE: CPT | Performed by: INTERNAL MEDICINE

## 2025-04-28 PROCEDURE — 25000003 PHARM REV CODE 250: Performed by: INTERNAL MEDICINE

## 2025-04-28 RX ORDER — LANSOPRAZOLE 30 MG/1
30 CAPSULE, DELAYED RELEASE ORAL 2 TIMES DAILY
Qty: 60 CAPSULE | Refills: 0 | Status: SHIPPED | OUTPATIENT
Start: 2025-04-28 | End: 2025-05-28

## 2025-04-28 RX ORDER — LIDOCAINE HYDROCHLORIDE 20 MG/ML
5 SOLUTION OROPHARYNGEAL
Status: COMPLETED | OUTPATIENT
Start: 2025-04-28 | End: 2025-04-28

## 2025-04-28 RX ORDER — ALUMINUM HYDROXIDE, MAGNESIUM HYDROXIDE, AND SIMETHICONE 1200; 120; 1200 MG/30ML; MG/30ML; MG/30ML
30 SUSPENSION ORAL
Status: COMPLETED | OUTPATIENT
Start: 2025-04-28 | End: 2025-04-28

## 2025-04-28 RX ORDER — SUCRALFATE 1 G/10ML
1 SUSPENSION ORAL 4 TIMES DAILY
Qty: 280 ML | Refills: 0 | Status: SHIPPED | OUTPATIENT
Start: 2025-04-28 | End: 2025-05-05

## 2025-04-28 RX ORDER — SUCRALFATE 1 G/10ML
1 SUSPENSION ORAL
Status: COMPLETED | OUTPATIENT
Start: 2025-04-28 | End: 2025-04-28

## 2025-04-28 RX ADMIN — ALUMINUM HYDROXIDE, MAGNESIUM HYDROXIDE, AND SIMETHICONE 30 ML: 200; 200; 20 SUSPENSION ORAL at 01:04

## 2025-04-28 RX ADMIN — SUCRALFATE 1 G: 1 SUSPENSION ORAL at 01:04

## 2025-04-28 RX ADMIN — LIDOCAINE HYDROCHLORIDE 5 ML: 20 SOLUTION ORAL at 01:04

## 2025-04-28 NOTE — ED PROVIDER NOTES
"Encounter Date: 4/28/2025       History     Chief Complaint   Patient presents with    GI Problem     Pt states she has "gastritis". Has a hx and states meds she is prescribed is not working. N/V/D, heartburn, abd pain.     Presents requesting medication for GERD. States under the care of GI Clinic for GERD, using protonix BID w/o relief. Scheduled for upper endoscopy on July.     The history is provided by the patient.     Review of patient's allergies indicates:  No Known Allergies  Past Medical History:   Diagnosis Date    Asthma     GERD (gastroesophageal reflux disease) 1994    Migraine headache without aura      Past Surgical History:   Procedure Laterality Date    APPENDECTOMY      CHOLECYSTECTOMY  08/05/25    KNEE SURGERY      LAPAROSCOPIC CHOLECYSTECTOMY N/A 08/05/2024    Procedure: CHOLECYSTECTOMY, LAPAROSCOPIC;  Surgeon: Young Stubbs Jr., MD;  Location: [facility];  Service: General;  Laterality: N/A;     Family History   Problem Relation Name Age of Onset    Diabetes Maternal Grandfather      Lung cancer Mother Claudette     Alcohol abuse Mother Claudette     Asthma Mother Claudette     Cancer Mother Claudette     COPD Mother Claudette     Depression Mother Claudette     Arthritis Maternal Grandmother Claudette      Social History[1]  Review of Systems   Gastrointestinal:  Positive for abdominal pain.       Physical Exam     Initial Vitals [04/28/25 0116]   BP Pulse Resp Temp SpO2   114/72 96 16 98.2 °F (36.8 °C) 99 %      MAP       --         Physical Exam    Nursing note and vitals reviewed.  Constitutional: She appears well-developed and well-nourished. No distress.   HENT:   Head: Normocephalic and atraumatic. Mouth/Throat: Oropharynx is clear and moist.   Eyes: Conjunctivae are normal. Pupils are equal, round, and reactive to light.   Neck: Neck supple. No JVD present.   Normal range of motion.  Cardiovascular:  Normal rate, regular rhythm, normal heart sounds and intact distal pulses.     "       Pulmonary/Chest: Breath sounds normal.   Abdominal: Abdomen is soft. Bowel sounds are normal. She exhibits no distension. There is no abdominal tenderness. There is no rebound and no guarding.   Musculoskeletal:         General: No edema. Normal range of motion.      Cervical back: Normal range of motion and neck supple.     Neurological: She is alert and oriented to person, place, and time. She has normal strength.   Skin: Skin is warm and dry. No rash noted.   Psychiatric: Thought content normal.         ED Course   Procedures  Labs Reviewed   URINALYSIS, REFLEX TO URINE CULTURE - Abnormal       Result Value    Color, UA Light-Yellow      Appearance, UA Clear      Specific Gravity, UA 1.024      pH, UA 6.0      Protein, UA Negative      Glucose, UA Normal      Ketones, UA Negative      Blood, UA Negative      Bilirubin, UA Negative      Urobilinogen, UA Normal      Nitrites, UA Negative      Leukocyte Esterase,  (*)     RBC, UA 0-5      WBC, UA 11-20 (*)     Bacteria, UA None Seen      Squamous Epithelial Cells, UA Few (*)     Mucous, UA Trace (*)     Hyaline Casts, UA 0-2 (*)    CULTURE, URINE   POCT URINE PREGNANCY    POC Preg Test, Ur Negative       Acceptable Yes            Imaging Results    None          Medications   aluminum-magnesium hydroxide-simethicone 200-200-20 mg/5 mL suspension 30 mL (30 mLs Oral Given 4/28/25 0140)   LIDOcaine viscous HCl 2% oral solution 5 mL (5 mLs Oral Given 4/28/25 0140)   sucralfate 100 mg/mL suspension 1 g (1 g Oral Given 4/28/25 0140)     Medical Decision Making  Amount and/or Complexity of Data Reviewed  Labs: ordered. Decision-making details documented in ED Course.    Risk  OTC drugs.  Prescription drug management.      Additional MDM:   Differential Diagnosis:   Appendicitis, Diverticulitis, Pancreatitis, Pyelonephritis, AAA, Dissection, MI, Gastric Ulcer, Peptic Ulcer, Urinary retention, among others                                         Clinical Impression:  Final diagnoses:  [K21.9] Gastroesophageal reflux disease, unspecified whether esophagitis present (Primary)          ED Disposition Condition    Discharge Stable          ED Prescriptions       Medication Sig Dispense Start Date End Date Auth. Provider    lansoprazole (PREVACID) 30 MG capsule Take 1 capsule (30 mg total) by mouth 2 (two) times a day. 60 capsule 4/28/2025 5/28/2025 Devin Swan MD    sucralfate (CARAFATE) 100 mg/mL suspension Take 10 mLs (1 g total) by mouth 4 (four) times daily. for 7 days 280 mL 4/28/2025 5/5/2025 Devin Swan MD          Follow-up Information       Follow up With Specialties Details Why Contact Info    Dory Parsons NP Family Medicine In 1 week  PO   [facility] [id number] [phone]      [facility] Emergency Medicine  If symptoms worsen [facility] [facility], [place] [address] [phone]    Usha Puente, P Gastroenterology Schedule an appointment as soon as possible for a visit in 1 week  [address] [facility] [address] [phone]                   [1]   Social History  Tobacco Use    Smoking status: Never     Passive exposure: Never    Smokeless tobacco: Never   Substance Use Topics    Alcohol use: Yes    Drug use: Never        Devin Swan MD  04/28/25 1276

## 2025-04-30 LAB — BACTERIA UR CULT: NORMAL

## 2025-05-08 ENCOUNTER — ANESTHESIA EVENT (OUTPATIENT)
Dept: ENDOSCOPY | Facility: HOSPITAL | Age: 30
End: 2025-05-08
Payer: MEDICAID

## 2025-05-08 RX ORDER — FAMOTIDINE 10 MG/1
10 TABLET ORAL 2 TIMES DAILY
COMMUNITY

## 2025-05-08 RX ORDER — OMEPRAZOLE 10 MG/1
10 CAPSULE, DELAYED RELEASE ORAL DAILY
COMMUNITY

## 2025-05-08 RX ORDER — MELOXICAM 7.5 MG/1
7.5 TABLET ORAL 2 TIMES DAILY
COMMUNITY

## 2025-05-08 RX ORDER — SODIUM CHLORIDE, SODIUM LACTATE, POTASSIUM CHLORIDE, CALCIUM CHLORIDE 600; 310; 30; 20 MG/100ML; MG/100ML; MG/100ML; MG/100ML
INJECTION, SOLUTION INTRAVENOUS CONTINUOUS
OUTPATIENT
Start: 2025-05-08

## 2025-05-08 RX ORDER — LIDOCAINE HYDROCHLORIDE 10 MG/ML
1 INJECTION, SOLUTION EPIDURAL; INFILTRATION; INTRACAUDAL; PERINEURAL ONCE
OUTPATIENT
Start: 2025-05-08 | End: 2025-05-08

## 2025-05-08 RX ORDER — SUCRALFATE 1 G/1
1 TABLET ORAL 4 TIMES DAILY
COMMUNITY

## 2025-05-08 NOTE — ANESTHESIA PREPROCEDURE EVALUATION
"                                                                                                             05/08/2025  Susan Banerjee is a 30 y.o., female with PMHx of obesity, asthma, GERD, anxiety presents for EGD secondary to epigastric pain.    NO BETA BLOCKER USE    Active Ambulatory Problems     Diagnosis Date Noted    Encounter for IUD removal 06/15/2022    Myalgia of pelvic floor 06/15/2022    Chronic pelvic pain in female 06/15/2022    Encounter for initial prescription of contraceptive pills 06/15/2022    Anxiety 09/16/2019    Asthma 12/02/2022    Attention deficit hyperactivity disorder (ADHD) 12/02/2022    Epigastric abdominal pain 02/05/2025    Gastroesophageal reflux disease 02/05/2025    Nausea and vomiting 02/05/2025    Hepatic steatosis 02/05/2025     Resolved Ambulatory Problems     Diagnosis Date Noted    No Resolved Ambulatory Problems     Past Medical History:   Diagnosis Date    GERD (gastroesophageal reflux disease) 1994    Migraine headache without aura        Pre-op Assessment    I have reviewed the NPO Status.      Review of Systems  Anesthesia Hx:  No problems with previous Anesthesia                Social:  Non-Smoker       Cardiovascular:  Cardiovascular Normal                                              Pulmonary:    Asthma mild                   Renal/:  Renal/ Normal                 Hepatic/GI:     GERD, poorly controlled                Neurological:  Neurology Normal                                      Endocrine:        Obesity / BMI > 30  Psych:   anxiety               Vitals:    05/08/25 1253 05/12/25 0926   BP:  101/64   BP Location:  Right arm   Patient Position:  Lying   Pulse:  82   Resp:  20   Temp:  36.7 °C (98.1 °F)   TempSrc:  Oral   SpO2:  99%   Weight: 82.1 kg (181 lb) 81.7 kg (180 lb 3.2 oz)   Height: 5' 1" (1.549 m) 5' 1" (1.549 m)         Physical Exam  General: Alert, Cooperative and Well nourished    Airway:  Mallampati: II   Mouth Opening: " Normal  TM Distance: Normal  Tongue: Normal  Neck ROM: Normal ROM    Dental:  Intact    Chest/Lungs:  Clear to auscultation, Normal Respiratory Rate    Heart:  Rate: Normal  Rhythm: Regular Rhythm  Sounds: Normal       Latest Reference Range & Units 05/12/25 09:31   hCG Qualitative, Urine Negative  Negative     Lab Results   Component Value Date    WBC 7.27 02/05/2025    HGB 14.1 02/05/2025    HCT 43.9 02/05/2025    MCV 87.1 02/05/2025     02/05/2025       CMP  Sodium   Date Value Ref Range Status   02/05/2025 139 136 - 145 mmol/L Final     Potassium   Date Value Ref Range Status   02/05/2025 4.0 3.5 - 5.1 mmol/L Final     Chloride   Date Value Ref Range Status   02/05/2025 107 98 - 107 mmol/L Final     CO2   Date Value Ref Range Status   02/05/2025 26 22 - 29 mmol/L Final     Glucose   Date Value Ref Range Status   02/05/2025 89 74 - 100 mg/dL Final     Blood Urea Nitrogen   Date Value Ref Range Status   02/05/2025 20.0 (H) 7.0 - 18.7 mg/dL Final     Creatinine   Date Value Ref Range Status   02/05/2025 0.90 0.55 - 1.02 mg/dL Final     Calcium   Date Value Ref Range Status   02/05/2025 9.4 8.4 - 10.2 mg/dL Final     Protein Total   Date Value Ref Range Status   02/05/2025 8.3 6.4 - 8.3 gm/dL Final     Albumin   Date Value Ref Range Status   02/05/2025 3.9 3.5 - 5.0 g/dL Final     Bilirubin Total   Date Value Ref Range Status   02/05/2025 0.4 <=1.5 mg/dL Final     ALP   Date Value Ref Range Status   02/05/2025 77 40 - 150 unit/L Final     AST   Date Value Ref Range Status   02/05/2025 19 5 - 34 unit/L Final     ALT   Date Value Ref Range Status   02/05/2025 13 0 - 55 unit/L Final     eGFR   Date Value Ref Range Status   02/05/2025 >60 mL/min/1.73/m2 Final     Comment:     Estimated GFR calculated using the CKD-EPI creatinine (2021) equation.             Anesthesia Plan  Type of Anesthesia, risks & benefits discussed:    Anesthesia Type: Gen Natural Airway  Intra-op Monitoring Plan: Standard ASA Monitors  Post  Op Pain Control Plan: IV/PO Opioids PRN  Induction:  IV  Informed Consent: Informed consent signed with the Patient and all parties understand the risks and agree with anesthesia plan.  All questions answered.   ASA Score: 2  Day of Surgery Review of History & Physical: H&P Update referred to the surgeon/provider.    Ready For Surgery From Anesthesia Perspective.     .

## 2025-05-12 ENCOUNTER — HOSPITAL ENCOUNTER (OUTPATIENT)
Facility: HOSPITAL | Age: 30
Discharge: HOME | End: 2025-05-12
Attending: INTERNAL MEDICINE | Admitting: INTERNAL MEDICINE
Payer: MEDICAID

## 2025-05-12 ENCOUNTER — ANESTHESIA (OUTPATIENT)
Dept: ENDOSCOPY | Facility: HOSPITAL | Age: 30
End: 2025-05-12
Payer: MEDICAID

## 2025-05-12 DIAGNOSIS — R10.13 EPIGASTRIC ABDOMINAL PAIN: ICD-10-CM

## 2025-05-12 DIAGNOSIS — R11.2 NAUSEA AND VOMITING, UNSPECIFIED VOMITING TYPE: ICD-10-CM

## 2025-05-12 DIAGNOSIS — K21.9 GASTROESOPHAGEAL REFLUX DISEASE, UNSPECIFIED WHETHER ESOPHAGITIS PRESENT: ICD-10-CM

## 2025-05-12 LAB
B-HCG UR QL: NEGATIVE
CTP QC/QA: YES

## 2025-05-12 PROCEDURE — 37000008 HC ANESTHESIA 1ST 15 MINUTES: Performed by: INTERNAL MEDICINE

## 2025-05-12 PROCEDURE — 27201423 OPTIME MED/SURG SUP & DEVICES STERILE SUPPLY: Performed by: INTERNAL MEDICINE

## 2025-05-12 PROCEDURE — 63600175 PHARM REV CODE 636 W HCPCS: Performed by: NURSE ANESTHETIST, CERTIFIED REGISTERED

## 2025-05-12 PROCEDURE — 37000009 HC ANESTHESIA EA ADD 15 MINS: Performed by: INTERNAL MEDICINE

## 2025-05-12 PROCEDURE — 81025 URINE PREGNANCY TEST: CPT | Performed by: ANESTHESIOLOGY

## 2025-05-12 PROCEDURE — 88312 SPECIAL STAINS GROUP 1: CPT | Mod: TC,59 | Performed by: INTERNAL MEDICINE

## 2025-05-12 PROCEDURE — 43239 EGD BIOPSY SINGLE/MULTIPLE: CPT | Performed by: INTERNAL MEDICINE

## 2025-05-12 RX ORDER — PROPOFOL 10 MG/ML
VIAL (ML) INTRAVENOUS
Status: DISCONTINUED | OUTPATIENT
Start: 2025-05-12 | End: 2025-05-12

## 2025-05-12 RX ORDER — LIDOCAINE HYDROCHLORIDE 20 MG/ML
INJECTION, SOLUTION EPIDURAL; INFILTRATION; INTRACAUDAL; PERINEURAL
Status: DISCONTINUED | OUTPATIENT
Start: 2025-05-12 | End: 2025-05-12

## 2025-05-12 RX ADMIN — PROPOFOL 40 MG: 10 INJECTION, EMULSION INTRAVENOUS at 11:05

## 2025-05-12 RX ADMIN — LIDOCAINE HYDROCHLORIDE 50 MG: 20 INJECTION, SOLUTION EPIDURAL; INFILTRATION; INTRACAUDAL; PERINEURAL at 11:05

## 2025-05-12 RX ADMIN — PROPOFOL 80 MG: 10 INJECTION, EMULSION INTRAVENOUS at 11:05

## 2025-05-12 NOTE — TRANSFER OF CARE
"Anesthesia Transfer of Care Note    Patient: Susan Banerjee    Procedure(s) Performed: Procedure(s) (LRB):  EGD, WITH CLOSED BIOPSY (N/A)    Patient location: GI    Anesthesia Type: general    Post pain: adequate analgesia    Post assessment: no apparent anesthetic complications and tolerated procedure well    Post vital signs: stable    Level of consciousness: awake    Nausea/Vomiting: no nausea/vomiting    Complications: none    Transfer of care protocol was followed      Last vitals: Visit Vitals  /64 (BP Location: Right arm, Patient Position: Lying)   Pulse 82   Temp 36.7 °C (98.1 °F) (Oral)   Resp 20   Ht 5' 1" (1.549 m)   Wt 81.7 kg (180 lb 3.2 oz)   SpO2 99%   BMI 34.05 kg/m²     "

## 2025-05-12 NOTE — PROVATION PATIENT INSTRUCTIONS
Discharge Summary/Instructions after an Endoscopic Procedure  Patient Name: Susan Banerjee  Patient MRN: [id number]  Patient YOB: 1995  Monday, May 12, 2025  Tracy Giraldo MD  Dear patient,  As a result of recent federal legislation (The Federal Cures Act), you may   receive lab or pathology results from your procedure in your [facility]   account before your physician is able to contact you. Your physician or   their representative will relay the results to you with their   recommendations at their soonest availability.  Thank you,  RESTRICTIONS:  During your procedure today, you received medications for sedation.  These   medications may affect your judgment, balance and coordination.  Therefore,   for 24 hours, you have the following restrictions:   - DO NOT drive a car, operate machinery, make legal/financial decisions,   sign important papers or drink alcohol.    ACTIVITY:  Today: no heavy lifting, straining or running due to procedural   sedation/anesthesia.  The following day: return to full activity including work.  DIET:  Eat and drink normally unless instructed otherwise.     TREATMENT FOR COMMON SIDE EFFECTS:  - Mild abdominal pain, nausea, belching, bloating or excessive gas:  rest,   eat lightly and use a heating pad.  - Sore Throat: treat with throat lozenges and/or gargle with warm salt   water.  - Because air was used during the procedure, expelling large amounts of air   from your rectum or belching is normal.  - If a bowel prep was taken, you may not have a bowel movement for 1-3 days.    This is normal.  SYMPTOMS TO WATCH FOR AND REPORT TO YOUR PHYSICIAN:  1. Abdominal pain or bloating, other than gas cramps.  2. Chest pain.  3. Back pain.  4. Signs of infection such as: chills or fever occurring within 24 hours   after the procedure.  5. Rectal bleeding, which would show as bright red, maroon, or black stools.   (A tablespoon of blood from the rectum is not serious,  especially if   hemorrhoids are present.)  6. Vomiting.  7. Weakness or dizziness.  GO DIRECTLY TO THE NEAREST EMERGENCY ROOM IF YOU HAVE ANY OF THE FOLLOWING:      Difficulty breathing              Chills and/or fever over 101 F   Persistent vomiting and/or vomiting blood   Severe abdominal pain   Severe chest pain   Black, tarry stools   Bleeding- more than one tablespoon   Any other symptom or condition that you feel may need urgent attention  Your doctor recommends these additional instructions:  If any biopsies were taken, your doctors clinic will contact you in 1 to 2   weeks with any results.  Recommendations:  - Patient has a contact number available for emergencies.  The signs and   symptoms of potential delayed complications were discussed with the   patient.  Return to normal activities tomorrow.  Written discharge   instructions were provided to the patient.   - Discharge patient to home.   - Resume previous diet.   - Continue present medications.   - Await pathology results.  Impressions:  - Normal esophagus.   - Z-line regular, 34 cm from the incisors.   - Normal stomach.  Biopsied.   - Normal examined duodenum.   - Consider DGBI as source of symptoms  For questions, problems or results please call your physician - Tracy Giraldo MD at Work:  [phone], Work:  [phone].  [facility] , EMERGENCY ROOM PHONE NUMBER: [phone]  IF A COMPLICATION OR EMERGENCY SITUATION ARISES AND YOU ARE UNABLE TO REACH   YOUR PHYSICIAN - GO DIRECTLY TO THE EMERGENCY ROOM.  Tracy Giraldo MD  5/12/2025 12:09:22 PM  This report has been verified and signed electronically.  Dear patient,  As a result of recent federal legislation (The Federal Cures Act), you may   receive lab or pathology results from your procedure in your [facility]   account before your physician is able to contact you. Your physician or   their representative will relay the results to you with their    recommendations at their soonest availability.  Thank you,  PROVATION

## 2025-05-12 NOTE — PLAN OF CARE
Back from procedure. Awake and alert. No acute distress. Sipping juice. Dc instructions given and reviewed

## 2025-05-12 NOTE — H&P
History and Physical    Patient Name: Susan Banerjee  YOB: 1995  Date: 05/12/2025 7:38 AM  Date of Admission: (Not on file)  HD#0  POD#* No surgery found *    PRESENTING HISTORY   Chief Complaint/Reason for Admission: <principal problem not specified>    History of Present Illness:  30 y.o. female with PMH asthma, migraines, presenting today for EGD    Denies any recent fever, chills, nausea, vomiting, chest pain, abdominal pain, bleeding per rectum.  Abdominal symptoms - epigastric abdominal pain/burning  Family history - lung cancer in mother  Prior EGD: none  Anticoagulation - none    Review of Systems:  12 point ROS negative except as stated in HPI    PAST HISTORY:   Past medical history:  Past Medical History:   Diagnosis Date    Asthma     GERD (gastroesophageal reflux disease) 1994    Migraine headache without aura        Past surgical history:  Past Surgical History:   Procedure Laterality Date    APPENDECTOMY      CHOLECYSTECTOMY  08/05/25    KNEE SURGERY      LAPAROSCOPIC CHOLECYSTECTOMY N/A 08/05/2024    Procedure: CHOLECYSTECTOMY, LAPAROSCOPIC;  Surgeon: Young Stubbs Jr., MD;  Location: [facility];  Service: General;  Laterality: N/A;       Family history:  Family History   Problem Relation Name Age of Onset    Diabetes Maternal Grandfather      Lung cancer Mother Claudette     Alcohol abuse Mother Claudette     Asthma Mother Claudette     Cancer Mother Claudette     COPD Mother Claudette     Depression Mother Claudette     Arthritis Maternal Grandmother Claudette        Social history:  Social History     Socioeconomic History    Marital status: Single   Tobacco Use    Smoking status: Never     Passive exposure: Never    Smokeless tobacco: Never   Substance and Sexual Activity    Alcohol use: Yes    Drug use: Never    Sexual activity: Yes     Partners: Male     Birth control/protection: I.U.D.     Social Drivers of Health     Food Insecurity: No Food Insecurity  (6/15/2022)    Hunger Vital Sign     Worried About Running Out of Food in the Last Year: Never true     Ran Out of Food in the Last Year: Never true   Transportation Needs: No Transportation Needs (6/15/2022)    PRAPARE - Transportation     Lack of Transportation (Medical): No     Lack of Transportation (Non-Medical): No   Housing Stability: Unknown (6/15/2022)    Housing Stability Vital Sign     Unable to Pay for Housing in the Last Year: No     Unstable Housing in the Last Year: No     Tobacco Use History[1]   Social History     Substance and Sexual Activity   Alcohol Use Yes        MEDICATIONS & ALLERGIES:     Current Facility-Administered Medications on File Prior to Encounter   Medication    levonorgestreL (MIRENA) 21 mcg/24 hours (8 yrs) 52 mg IUD 1 Intra Uterine Device     Current Outpatient Medications on File Prior to Encounter   Medication Sig    albuterol (PROVENTIL/VENTOLIN HFA) 90 mcg/actuation inhaler 2 puffs every 4 (four) hours as needed.    famotidine (PEPCID) 10 MG tablet Take 10 mg by mouth 2 (two) times daily.    fluticasone-salmeterol diskus inhaler 250-50 mcg Advair Diskus 250 mcg-50 mcg/dose powder for inhalation   Inhale 1 puff twice a day by inhalation route for 30 days.    meloxicam (MOBIC) 7.5 MG tablet Take 7.5 mg by mouth 2 (two) times daily.    omeprazole (PRILOSEC) 10 MG capsule Take 10 mg by mouth once daily.    pantoprazole (PROTONIX) 40 MG tablet Take 1 tablet (40 mg total) by mouth once daily.    sucralfate (CARAFATE) 1 gram tablet Take 1 g by mouth 4 (four) times daily.    ondansetron (ZOFRAN-ODT) 4 MG TbDL Take 1 tablet (4 mg total) by mouth every 8 (eight) hours as needed.       Allergies: Review of patient's allergies indicates:  No Known Allergies    Scheduled Meds:   levonorgestreL  1 Intra Uterine Device Intrauterine        Continuous Infusions:    PRN Meds:  Current Facility-Administered Medications:     levonorgestreL, 1 Intra Uterine Device, Intrauterine,     OBJECTIVE:  "  Vital Signs:  VITAL SIGNS: 24 HR MIN & MAX LAST   No data recorded      No data recorded       No data recorded       No data recorded       No data recorded         HT: 5' 1" (154.9 cm)  WT: 82.1 kg (181 lb)  BMI: 34.2     Intake/output:  Intake/Output - Last 3 Shifts       None          No intake or output data in the 24 hours ending 05/12/25 0738      Physical Exam:  General: Well developed, well nourished, no acute distress  HEENT: Normocephalic, atraumatic, PERRL  CV: RR  Resp: NWOB  GI:  Abdomen soft, non-tender, non-distended, no guarding, no rebound, normoactive bowel sounds, no masses   :  Deferred  MSK: No muscle atrophy, cyanosis, peripheral edema, moving all extremities spontaneously  Skin/wounds:  No rashes, ulcers, erythema  Neuro:  CNII-XII grossly intact, alert and oriented to person, place, and time    Labs:  Troponin:  No results for input(s): "TROPONINI" in the last 72 hours.  CBC:  No results for input(s): "WBC", "RBC", "HGB", "HCT", "PLT", "MCV", "MCH", "MCHC" in the last 72 hours.  CMP:  No results for input(s): "GLU", "CALCIUM", "ALBUMIN", "PROT", "NA", "K", "CO2", "CL", "BUN", "CREATININE", "ALKPHOS", "ALT", "AST", "BILITOT" in the last 72 hours.  Lactic Acid:  No results for input(s): "LACTATE" in the last 72 hours.  ETOH:  No results for input(s): "ETHANOL" in the last 72 hours.   Urine Drug Screen:  No results for input(s): "COCAINE", "OPIATE", "BARBITURATE", "AMPHETAMINE", "FENTANYL", "CANNABINOIDS", "MDMA" in the last 72 hours.    Invalid input(s): "BENZODIAZEPINE", "PHENCYCLIDINE"   ABG:  No results for input(s): "PH", "PO2", "PCO2", "HCO3", "BE" in the last 168 hours.     Diagnostic Results:  No orders to display       ASSESSMENT & PLAN:    Susan Banerjee is a 30 y.o. female presenting today for EGD    Consent signed at bedside  Endo suite today for EGD    Fidencio Vela MD  [employer] General Surgery, PGY-2  05/12/2025         [1]   Social History  Tobacco Use   Smoking Status " Never    Passive exposure: Never   Smokeless Tobacco Never

## 2025-05-12 NOTE — PLAN OF CARE
Patient awake alert denies discomfort-dressing self at this time and family member here to transport patient back to home

## 2025-05-12 NOTE — ANESTHESIA POSTPROCEDURE EVALUATION
Anesthesia Post Evaluation    Patient: Susan Banerjee    Procedure(s) Performed: Procedure(s) (LRB):  EGD, WITH CLOSED BIOPSY (N/A)    Final Anesthesia Type: general      Patient location during evaluation: GI PACU  Patient participation: Yes- Able to Participate  Level of consciousness: awake and alert  Post-procedure vital signs: reviewed and stable  Pain management: adequate  Airway patency: patent    PONV status at discharge: No PONV  Anesthetic complications: no      Cardiovascular status: hemodynamically stable  Respiratory status: unassisted and room air  Follow-up not needed.              Vitals Value Taken Time   /64 05/12/25 09:26   Temp 36.7 °C (98.1 °F) 05/12/25 09:26   Pulse 82 05/12/25 09:26   Resp 20 05/12/25 09:26   SpO2 99 % 05/12/25 09:26         No case tracking events are documented in the log.      Pain/Alona Score: Alona Score: 8 (5/12/2025 11:40 AM)

## 2025-05-13 VITALS
TEMPERATURE: 98 F | DIASTOLIC BLOOD PRESSURE: 48 MMHG | OXYGEN SATURATION: 100 % | HEART RATE: 69 BPM | SYSTOLIC BLOOD PRESSURE: 101 MMHG | RESPIRATION RATE: 18 BRPM | BODY MASS INDEX: 34.02 KG/M2 | WEIGHT: 180.19 LBS | HEIGHT: 61 IN

## 2025-05-14 LAB
ESTROGEN SERPL-MCNC: NORMAL PG/ML
INSULIN SERPL-ACNC: NORMAL U[IU]/ML
LAB AP CLINICAL INFORMATION: NORMAL
LAB AP GROSS DESCRIPTION: NORMAL
LAB AP REPORT FOOTNOTES: NORMAL

## 2025-06-02 ENCOUNTER — OFFICE VISIT (OUTPATIENT)
Dept: ORTHOPEDICS | Facility: CLINIC | Age: 30
End: 2025-06-02
Payer: MEDICAID

## 2025-06-02 VITALS
DIASTOLIC BLOOD PRESSURE: 67 MMHG | OXYGEN SATURATION: 97 % | HEIGHT: 61 IN | TEMPERATURE: 98 F | HEART RATE: 77 BPM | RESPIRATION RATE: 17 BRPM | SYSTOLIC BLOOD PRESSURE: 102 MMHG | WEIGHT: 181.69 LBS | BODY MASS INDEX: 34.3 KG/M2

## 2025-06-02 DIAGNOSIS — S93.402D SPRAIN OF LEFT ANKLE, UNSPECIFIED LIGAMENT, SUBSEQUENT ENCOUNTER: Primary | ICD-10-CM

## 2025-06-02 PROCEDURE — 99214 OFFICE O/P EST MOD 30 MIN: CPT | Mod: PBBFAC

## 2025-06-02 RX ORDER — DICLOFENAC SODIUM 10 MG/G
2 GEL TOPICAL 4 TIMES DAILY
Qty: 100 G | Refills: 3 | Status: SHIPPED | OUTPATIENT
Start: 2025-06-02

## 2025-06-14 ENCOUNTER — HOSPITAL ENCOUNTER (EMERGENCY)
Facility: HOSPITAL | Age: 30
Discharge: HOME | End: 2025-06-14
Attending: EMERGENCY MEDICINE
Payer: MEDICAID

## 2025-06-14 VITALS
BODY MASS INDEX: 33.23 KG/M2 | HEIGHT: 61 IN | OXYGEN SATURATION: 97 % | SYSTOLIC BLOOD PRESSURE: 111 MMHG | TEMPERATURE: 99 F | RESPIRATION RATE: 18 BRPM | WEIGHT: 176 LBS | HEART RATE: 79 BPM | DIASTOLIC BLOOD PRESSURE: 72 MMHG

## 2025-06-14 DIAGNOSIS — N30.00 ACUTE CYSTITIS WITHOUT HEMATURIA: Primary | ICD-10-CM

## 2025-06-14 DIAGNOSIS — R10.13 EPIGASTRIC ABDOMINAL PAIN: ICD-10-CM

## 2025-06-14 DIAGNOSIS — R07.9 CHEST PAIN: ICD-10-CM

## 2025-06-14 LAB
ALBUMIN SERPL-MCNC: 3.9 G/DL (ref 3.5–5)
ALBUMIN/GLOB SERPL: 0.9 RATIO (ref 1.1–2)
ALP SERPL-CCNC: 70 UNIT/L (ref 40–150)
ALT SERPL-CCNC: 17 UNIT/L (ref 0–55)
ANION GAP SERPL CALC-SCNC: 7 MEQ/L
AST SERPL-CCNC: 18 UNIT/L (ref 11–45)
B-HCG UR QL: NEGATIVE
BACTERIA #/AREA URNS AUTO: ABNORMAL /HPF
BASOPHILS # BLD AUTO: 0.04 X10(3)/MCL
BASOPHILS NFR BLD AUTO: 0.2 %
BILIRUB SERPL-MCNC: 0.2 MG/DL
BILIRUB UR QL STRIP.AUTO: NEGATIVE
BUN SERPL-MCNC: 21.1 MG/DL (ref 7–18.7)
CALCIUM SERPL-MCNC: 9.2 MG/DL (ref 8.4–10.2)
CHLORIDE SERPL-SCNC: 110 MMOL/L (ref 98–107)
CLARITY UR: CLEAR
CO2 SERPL-SCNC: 25 MMOL/L (ref 22–29)
COLOR UR AUTO: ABNORMAL
CREAT SERPL-MCNC: 0.86 MG/DL (ref 0.55–1.02)
CREAT/UREA NIT SERPL: 25
EOSINOPHIL # BLD AUTO: 0.12 X10(3)/MCL (ref 0–0.9)
EOSINOPHIL NFR BLD AUTO: 0.7 %
ERYTHROCYTE [DISTWIDTH] IN BLOOD BY AUTOMATED COUNT: 12.4 % (ref 11.5–17)
GFR SERPLBLD CREATININE-BSD FMLA CKD-EPI: >60 ML/MIN/1.73/M2
GLOBULIN SER-MCNC: 4.4 GM/DL (ref 2.4–3.5)
GLUCOSE SERPL-MCNC: 118 MG/DL (ref 74–100)
GLUCOSE UR QL STRIP: NEGATIVE
HCT VFR BLD AUTO: 46.4 % (ref 37–47)
HGB BLD-MCNC: 15 G/DL (ref 12–16)
HGB UR QL STRIP: ABNORMAL
IMM GRANULOCYTES # BLD AUTO: 0.05 X10(3)/MCL (ref 0–0.04)
IMM GRANULOCYTES NFR BLD AUTO: 0.3 %
KETONES UR QL STRIP: NEGATIVE
LEUKOCYTE ESTERASE UR QL STRIP: ABNORMAL
LIPASE SERPL-CCNC: 28 U/L
LYMPHOCYTES # BLD AUTO: 3.29 X10(3)/MCL (ref 0.6–4.6)
LYMPHOCYTES NFR BLD AUTO: 20.5 %
MCH RBC QN AUTO: 29.1 PG (ref 27–31)
MCHC RBC AUTO-ENTMCNC: 32.3 G/DL (ref 33–36)
MCV RBC AUTO: 90.1 FL (ref 80–94)
MONOCYTES # BLD AUTO: 0.53 X10(3)/MCL (ref 0.1–1.3)
MONOCYTES NFR BLD AUTO: 3.3 %
NEUTROPHILS # BLD AUTO: 12.03 X10(3)/MCL (ref 2.1–9.2)
NEUTROPHILS NFR BLD AUTO: 75 %
NITRITE UR QL STRIP: NEGATIVE
NRBC BLD AUTO-RTO: 0 %
PH UR STRIP: 6 [PH]
PLATELET # BLD AUTO: 253 X10(3)/MCL (ref 130–400)
PMV BLD AUTO: 10.2 FL (ref 7.4–10.4)
POTASSIUM SERPL-SCNC: 4.1 MMOL/L (ref 3.5–5.1)
PROT SERPL-MCNC: 8.3 GM/DL (ref 6.4–8.3)
PROT UR QL STRIP: NEGATIVE
RBC # BLD AUTO: 5.15 X10(6)/MCL (ref 4.2–5.4)
RBC #/AREA URNS AUTO: ABNORMAL /HPF
SODIUM SERPL-SCNC: 142 MMOL/L (ref 136–145)
SP GR UR STRIP.AUTO: 1.02 (ref 1–1.03)
SQUAMOUS #/AREA URNS AUTO: ABNORMAL /HPF
TROPONIN I SERPL-MCNC: <0.01 NG/ML (ref 0–0.04)
UROBILINOGEN UR STRIP-ACNC: 0.2
WBC # BLD AUTO: 16.06 X10(3)/MCL (ref 4.5–11.5)
WBC #/AREA URNS AUTO: ABNORMAL /HPF

## 2025-06-14 PROCEDURE — 63600175 PHARM REV CODE 636 W HCPCS

## 2025-06-14 PROCEDURE — 96361 HYDRATE IV INFUSION ADD-ON: CPT

## 2025-06-14 PROCEDURE — 99285 EMERGENCY DEPT VISIT HI MDM: CPT | Mod: 25

## 2025-06-14 PROCEDURE — 81025 URINE PREGNANCY TEST: CPT

## 2025-06-14 PROCEDURE — 93005 ELECTROCARDIOGRAM TRACING: CPT

## 2025-06-14 PROCEDURE — 81003 URINALYSIS AUTO W/O SCOPE: CPT

## 2025-06-14 PROCEDURE — 96374 THER/PROPH/DIAG INJ IV PUSH: CPT

## 2025-06-14 PROCEDURE — 93010 ELECTROCARDIOGRAM REPORT: CPT | Mod: ,,, | Performed by: INTERNAL MEDICINE

## 2025-06-14 PROCEDURE — 87086 URINE CULTURE/COLONY COUNT: CPT

## 2025-06-14 PROCEDURE — 83690 ASSAY OF LIPASE: CPT

## 2025-06-14 PROCEDURE — 80053 COMPREHEN METABOLIC PANEL: CPT

## 2025-06-14 PROCEDURE — 25500020 PHARM REV CODE 255

## 2025-06-14 PROCEDURE — 96375 TX/PRO/DX INJ NEW DRUG ADDON: CPT

## 2025-06-14 PROCEDURE — 84484 ASSAY OF TROPONIN QUANT: CPT

## 2025-06-14 PROCEDURE — 85025 COMPLETE CBC W/AUTO DIFF WBC: CPT

## 2025-06-14 PROCEDURE — 25000003 PHARM REV CODE 250

## 2025-06-14 RX ORDER — ALUMINUM HYDROXIDE, MAGNESIUM HYDROXIDE, AND SIMETHICONE 1200; 120; 1200 MG/30ML; MG/30ML; MG/30ML
30 SUSPENSION ORAL ONCE
Status: COMPLETED | OUTPATIENT
Start: 2025-06-14 | End: 2025-06-14

## 2025-06-14 RX ORDER — LIDOCAINE HYDROCHLORIDE 20 MG/ML
15 SOLUTION OROPHARYNGEAL ONCE
Status: COMPLETED | OUTPATIENT
Start: 2025-06-14 | End: 2025-06-14

## 2025-06-14 RX ORDER — CEFDINIR 300 MG/1
300 CAPSULE ORAL EVERY 12 HOURS
Qty: 14 CAPSULE | Refills: 0 | Status: SHIPPED | OUTPATIENT
Start: 2025-06-14 | End: 2025-06-21

## 2025-06-14 RX ORDER — MORPHINE SULFATE 4 MG/ML
4 INJECTION, SOLUTION INTRAMUSCULAR; INTRAVENOUS
Refills: 0 | Status: COMPLETED | OUTPATIENT
Start: 2025-06-14 | End: 2025-06-14

## 2025-06-14 RX ORDER — SODIUM CHLORIDE 9 MG/ML
1000 INJECTION, SOLUTION INTRAVENOUS
Status: COMPLETED | OUTPATIENT
Start: 2025-06-14 | End: 2025-06-14

## 2025-06-14 RX ORDER — SUCRALFATE 1 G/1
1 TABLET ORAL
Status: COMPLETED | OUTPATIENT
Start: 2025-06-14 | End: 2025-06-14

## 2025-06-14 RX ORDER — ONDANSETRON HYDROCHLORIDE 2 MG/ML
4 INJECTION, SOLUTION INTRAVENOUS
Status: COMPLETED | OUTPATIENT
Start: 2025-06-14 | End: 2025-06-14

## 2025-06-14 RX ORDER — LIDOCAINE HYDROCHLORIDE 20 MG/ML
SOLUTION OROPHARYNGEAL 3 TIMES DAILY PRN
Qty: 100 ML | Refills: 0 | Status: SHIPPED | OUTPATIENT
Start: 2025-06-14

## 2025-06-14 RX ADMIN — LIDOCAINE HYDROCHLORIDE 15 ML: 20 SOLUTION ORAL at 07:06

## 2025-06-14 RX ADMIN — SODIUM CHLORIDE 1000 ML: 9 INJECTION, SOLUTION INTRAVENOUS at 06:06

## 2025-06-14 RX ADMIN — ALUMINUM HYDROXIDE, MAGNESIUM HYDROXIDE, AND SIMETHICONE 30 ML: 200; 200; 20 SUSPENSION ORAL at 07:06

## 2025-06-14 RX ADMIN — IOHEXOL 100 ML: 350 INJECTION, SOLUTION INTRAVENOUS at 06:06

## 2025-06-14 RX ADMIN — SUCRALFATE 1 G: 1 TABLET ORAL at 07:06

## 2025-06-14 RX ADMIN — ONDANSETRON 4 MG: 2 INJECTION INTRAMUSCULAR; INTRAVENOUS at 06:06

## 2025-06-14 RX ADMIN — MORPHINE SULFATE 4 MG: 4 INJECTION INTRAVENOUS at 06:06

## 2025-06-14 NOTE — ED TRIAGE NOTES
Pt complaint of worsening abd pain not relieved with gastritis meds relating that she was initally seen at [facility] a few days ago

## 2025-06-14 NOTE — ED PROVIDER NOTES
Encounter Date: 6/14/2025       History     Chief Complaint   Patient presents with    Abdominal Pain     Pt complaint of worsening abd pain not relieved with gastritis meds relating that she was initally seen at [facility] ER a few days ago     30 y.o. White female presents to Emergency Department with a chief complaint of abdominal pain. Symptoms began 5 days ago and have been constant since onset. Patient reports history of gastritis, taking prescribed medication at home without relief. Associated symptoms include nausea and chills. Symptoms are aggravated with palpation and there are no alleviating factors. The patient denies CP, SOB, fever, vomiting, constipation, or dizziness. No other reported symptoms at this time. LBM: today. GI: Enzo       The history is provided by the patient. No  was used.   Abdominal Pain  The current episode started several days ago. The onset of the illness was abrupt. The problem has not changed since onset.The abdominal pain is located in the epigastric region. The abdominal pain does not radiate. The other symptoms of the illness include nausea. The other symptoms of the illness do not include fever, fatigue, shortness of breath, vomiting, diarrhea or dysuria.   Additional symptoms associated with the illness include chills. Symptoms associated with the illness do not include anorexia, diaphoresis, constipation, urgency, frequency or back pain.     Review of patient's allergies indicates:  No Known Allergies  Past Medical History:   Diagnosis Date    Asthma     GERD (gastroesophageal reflux disease) 1994    Migraine headache without aura      Past Surgical History:   Procedure Laterality Date    APPENDECTOMY      CHOLECYSTECTOMY  08/05/25    EGD, WITH CLOSED BIOPSY N/A 5/12/2025    Procedure: EGD, WITH CLOSED BIOPSY;  Surgeon: Tracy Giraldo MD;  Location: [facility] END[facility]COPY;  Service: Gastroenterology;  Laterality: N/A;    KNEE SURGERY       LAPAROSCOPIC CHOLECYSTECTOMY N/A 08/05/2024    Procedure: CHOLECYSTECTOMY, LAPAROSCOPIC;  Surgeon: Yuong Stubbs Jr., MD;  Location: [facility];  Service: General;  Laterality: N/A;     Family History   Problem Relation Name Age of Onset    Diabetes Maternal Grandfather      Lung cancer Mother Claudette     Alcohol abuse Mother Claudette     Asthma Mother Claudette     Cancer Mother Claudette     COPD Mother Claudette     Depression Mother Claudette     Arthritis Maternal Grandmother Claudette      Social History[1]  Review of Systems   Constitutional:  Positive for chills. Negative for diaphoresis, fatigue and fever.   Eyes:  Negative for photophobia and visual disturbance.   Respiratory:  Negative for cough, shortness of breath, wheezing and stridor.    Cardiovascular:  Negative for chest pain and leg swelling.   Gastrointestinal:  Positive for abdominal pain and nausea. Negative for anorexia, constipation, diarrhea and vomiting.   Genitourinary:  Negative for dysuria, flank pain, frequency and urgency.   Musculoskeletal:  Negative for back pain, gait problem, joint swelling and myalgias.   All other systems reviewed and are negative.      Physical Exam     Initial Vitals [06/14/25 1740]   BP Pulse Resp Temp SpO2   105/67 95 18 98.8 °F (37.1 °C) 98 %      MAP       --         Physical Exam    Nursing note and vitals reviewed.  Constitutional: She appears well-developed and well-nourished. She is not diaphoretic. She is cooperative.  Non-toxic appearance. No distress.   HENT:   Head: Normocephalic and atraumatic.   Right Ear: External ear normal.   Left Ear: External ear normal.   Nose: Nose normal.   Eyes: Conjunctivae and EOM are normal. Pupils are equal, round, and reactive to light.   Neck: Neck supple. No thyromegaly present. No tracheal deviation present.   Normal range of motion.  Cardiovascular:  Normal rate, regular rhythm, S1 normal, S2 normal, normal heart sounds, intact distal pulses and normal  pulses.           Pulmonary/Chest: Effort normal and breath sounds normal. No accessory muscle usage or stridor. No tachypnea and no bradypnea. No respiratory distress. She has no decreased breath sounds. She has no wheezes. She has no rhonchi. She has no rales. She exhibits no tenderness.   Abdominal: Abdomen is soft. Bowel sounds are normal. She exhibits no distension. There is abdominal tenderness in the right upper quadrant and epigastric area.   Tenderness noted to outlined area. Abdomen is soft. No guarding.    No right CVA tenderness.  No left CVA tenderness.     There is no rebound and no guarding.   Musculoskeletal:         General: Normal range of motion.      Cervical back: Normal range of motion and neck supple.     Neurological: She is alert and oriented to person, place, and time. She has normal strength. No sensory deficit. GCS score is 15. GCS eye subscore is 4. GCS verbal subscore is 5. GCS motor subscore is 6.   Skin: Skin is warm and dry. Capillary refill takes less than 2 seconds.   Psychiatric: Her speech is normal and behavior is normal. Thought content normal. Her mood appears anxious.         ED Course   Procedures  Labs Reviewed   COMPREHENSIVE METABOLIC PANEL - Abnormal       Result Value    Sodium 142      Potassium 4.1      Chloride 110 (*)     CO2 25      Glucose 118 (*)     Blood Urea Nitrogen 21.1 (*)     Creatinine 0.86      Calcium 9.2      Protein Total 8.3      Albumin 3.9      Globulin 4.4 (*)     Albumin/Globulin Ratio 0.9 (*)     Bilirubin Total 0.2      ALP 70      ALT 17      AST 18      eGFR >60      Anion Gap 7.0      BUN/Creatinine Ratio 25     URINALYSIS, REFLEX TO URINE CULTURE - Abnormal    Color, UA Straw      Appearance, UA Clear      Specific Gravity, UA 1.025      pH, UA 6.0      Protein, UA Negative      Glucose, UA Negative      Ketones, UA Negative      Blood, UA Trace (*)     Bilirubin, UA Negative      Urobilinogen, UA 0.2      Nitrites, UA Negative       Leukocyte Esterase, UA Moderate (*)    CBC WITH DIFFERENTIAL - Abnormal    WBC 16.06 (*)     RBC 5.15      Hgb 15.0      Hct 46.4      MCV 90.1      MCH 29.1      MCHC 32.3 (*)     RDW 12.4      Platelet 253      MPV 10.2      Neut % 75.0      Lymph % 20.5      Mono % 3.3      Eos % 0.7      Basophil % 0.2      Imm Grans % 0.3      Neut # 12.03 (*)     Lymph # 3.29      Mono # 0.53      Eos # 0.12      Baso # 0.04      Imm Gran # 0.05 (*)     NRBC% 0.0     URINALYSIS, MICROSCOPIC - Abnormal    Bacteria, UA Moderate (*)     RBC, UA 0-2      WBC, UA 21-50 (*)     Squamous Epithelial Cells, UA Few (*)    LIPASE - Normal    Lipase Level 28     PREGNANCY TEST, URINE RAPID - Normal    hCG Qualitative, Urine Negative     TROPONIN I - Normal    Troponin-I <0.010     CULTURE, URINE   CBC W/ AUTO DIFFERENTIAL    Narrative:     The following orders were created for panel order CBC W/ AUTO DIFFERENTIAL.  Procedure                               Abnormality         Status                     ---------                               -----------         ------                     CBC with Differential[[id number]]       Abnormal            Final result                 Please view results for these tests on the individual orders.     EKG Readings: (Independently Interpreted)   Initial Reading: No STEMI. Rhythm: Normal Sinus Rhythm. Heart Rate: 90. Ectopy: No Ectopy. Conduction: Normal. ST Segments: Normal ST Segments. T Waves: Normal. Clinical Impression: Normal Sinus Rhythm       Imaging Results              X-Ray Chest AP Portable (Final result)  Result time 06/14/25 19:21:14      Final result by Arnaldo Dillon MD (06/14/25 19:21:14)                   Impression:      No acute cardiopulmonary process identified.      Electronically signed by: Arnaldo Dillon  Date:    06/14/2025  Time:    19:21               Narrative:    EXAMINATION:  XR CHEST AP PORTABLE    CLINICAL HISTORY:  Chest pain, unspecified    TECHNIQUE:  One  view    COMPARISON:  July 22, 2022.    FINDINGS:  Cardiopericardial silhouette is within normal limits. Lungs are without dense focal or segmental consolidation, congestive process, pleural effusions or pneumothorax.                                       CT Abdomen Pelvis With IV Contrast NO Oral Contrast (Final result)  Result time 06/14/25 18:54:50      Final result by Arnaldo Dillon MD (06/14/25 18:54:50)                   Impression:      1. No abdominopelvic visceral acute findings identified.    2. Details of the findings above.      Electronically signed by: Arnaldo Dillon  Date:    06/14/2025  Time:    18:54               Narrative:    EXAMINATION:  CT ABDOMEN PELVIS WITH IV CONTRAST    CLINICAL HISTORY:  Epigastric pain;    TECHNIQUE:  Multidetector axial images were obtained of the abdomen and pelvis following the administration of IV contrast. Oral contrast was not administered.    Dose length product of 391 mGycm. Automated exposure control was utilized to minimize radiation dose.    COMPARISON:  14., 2020.    FINDINGS:  Included portion of the lungs are without acute air space infiltrates or fluid within the pleural spaces.    Hepatic volume and attenuation is unremarkable.  Gallbladder is surgically absent.  No biliary dilation identified. Pancreatic unremarkable attenuation without acute peripancreatic phlegmons. Main pancreatic duct is not dilated. Spleen is of normal size without focal lesion.    The adrenal glands appear within normal limits. The kidneys are unremarkable in size and contour. No solid or cystic renal lesion identified. There is no hydronephrosis. No perinephric fluid strandings or collections identified.    Stomach is partially decompressed.  No abnormal dilatation of loops of small bowel and there is no focal or generalized mural thickening.  Patient is status post appendectomy.  Colon is nondistended and there are no pericolonic acute strandings.  No transition or bowel  obstruction.    Urinary bladder is partially decompressed without thickened wall.  There is an intrauterine device.  Small amount of fluid within the pelvis likely is physiologic.    No acute or otherwise osseous abnormality identified.                                       Medications   0.9% NaCl infusion (0 mLs Intravenous Stopped 6/14/25 1920)   ondansetron injection 4 mg (4 mg Intravenous Given 6/14/25 1809)   morphine injection 4 mg (4 mg Intravenous Given 6/14/25 1809)   iohexoL (OMNIPAQUE 350) injection 100 mL (100 mLs Intravenous Given 6/14/25 1838)   sucralfate tablet 1 g (1 g Oral Given 6/14/25 1917)   aluminum-magnesium hydroxide-simethicone 200-200-20 mg/5 mL suspension 30 mL (30 mLs Oral Given 6/14/25 1916)     And   LIDOcaine viscous HCl 2% oral solution 15 mL (15 mLs Oral Given 6/14/25 1916)     Medical Decision Making  Patient awake, alert, has non-labored breathing, and follows commands appropriately. Arrived to ED due to RUQ abdominal pain and nausea. Hx of gastritis. Afebrile. Having BM. Recent EGD performed last month.     Judging by the patient's chief complaint and pertinent history, the patient has the following possible differential diagnoses, including but not limited to the following: GERD, Gastritis, Peptic Ulcer Disease, Epigastric Abdominal Pain    Some of these are deemed to be lower likelihood and some more likely based on my physical exam and history combined with possible lab work and/or imaging studies. Please see the pertinent studies, and refer to the HPI. Some of these diagnoses will take further evaluation to fully rule out, perhaps as an outpatient and the patient was encouraged to follow up when discharged for more comprehensive evaluation.       Amount and/or Complexity of Data Reviewed  Labs: ordered. Decision-making details documented in ED Course.     Details: Leukocytosis noted. H/ stable. Troponin negative. UA- WBCs, bacteria, leukocytes noted. Informed patient of  results.   Radiology: ordered. Decision-making details documented in ED Course.     Details: Informed patient of results.   ECG/medicine tests: ordered.  Discussion of management or test interpretation with external provider(s): Discussed patient and results with Dr. Felix who agrees with plan of care. Discussed plan of care and interventions with patient. Agreed to and aware of plan of care. Comfortable being discharged home. Patient discharged home. Patient denies new or additional complaints; no further tests indicated at this time. Verbalized understanding of instructions. No emergent or apparent distress noted prior to discharge. Strict ER return precautions given.       Risk  OTC drugs.  Prescription drug management.               ED Course as of 06/14/25 1957   Sat Jun 14, 2025 1759 WBC(!): 16.06  Likely reactive.  [JA]   1759 Hemoglobin: 15.0 [JA]   1759 Hematocrit: 46.4 [JA]   1802 Bilirubin (UA): Negative [JA]   1808 Bacteria, UA(!): Moderate [JA]   1808 WBC, UA(!): 21-50 [JA]   1809 Leukocyte Esterase, UA(!): Moderate [JA]   1827 Creatinine: 0.86 [JA]   1827 BUN(!): 21.1 [JA]   1827 Lipase: 28 [JA]   1840 Troponin I: <0.010 [JA]   1903 CT Abdomen Pelvis With IV Contrast NO Oral Contrast  1. No abdominopelvic visceral acute findings identified. 2. Details of the findings above.      [JA]   1906 Patient now reports epigastric pain is radiating into her chest. Still having continued pain. [JA]   1927 X-Ray Chest AP Portable  Cardiopericardial silhouette is within normal limits. Lungs are without dense focal or segmental consolidation, congestive process, pleural effusions or pneumothorax. [JA]   1952 Patient reports improvement in symptoms after med admin. Patient reports she has Protonix, Pepcid, Carafate, Zofran, Prevacid, Mylanta, and small amount of Lidocaine at home for symptoms. To f/u with Dr. Giraldo. At disposition, patient has no additional complaints, pain is not intractable, symptoms improved,  and non-toxic in appearance. Stable for CO home.  [JA]      ED Course User Index  [JA] Leana Cross NP                           Clinical Impression:  Final diagnoses:  [R10.13] Epigastric abdominal pain  [R07.9] Chest pain  [N30.00] Acute cystitis without hematuria (Primary)          ED Disposition Condition    Discharge Good          ED Prescriptions       Medication Sig Dispense Start Date End Date Auth. Provider    LIDOcaine viscous HCl 2% (LIDOCAINE VISCOUS) 2 % Soln by Mucous Membrane route 3 (three) times daily as needed. 100 mL 6/14/2025 -- Leana Cross NP    cefdinir (OMNICEF) 300 MG capsule Take 1 capsule (300 mg total) by mouth every 12 (twelve) hours. for 7 days 14 capsule 6/14/2025 6/21/2025 Leana Cross NP          Follow-up Information       Follow up With Specialties Details Why Contact Info    Dory Parsons NP Family Medicine Schedule an appointment as soon as possible for a visit in 2 days  PO   [facility] [address]  [phone]      [facility] - Emergency Dept Emergency Medicine Go to  If symptoms worsen, As needed [address] [facility] [id number] [phone]    Tracy Giraldo MD Gastroenterology Schedule an appointment as soon as possible for a visit in 2 days  [address] [facility] [address]  [phone]                     [1]   Social History  Tobacco Use    Smoking status: Never     Passive exposure: Never    Smokeless tobacco: Never   Substance Use Topics    Alcohol use: Not Currently    Drug use: Never        Leana Cross NP  06/14/25 2000

## 2025-06-15 LAB
OHS QRS DURATION: 88 MS
OHS QTC CALCULATION: 440 MS

## 2025-06-17 LAB — BACTERIA UR CULT: NORMAL

## 2025-06-18 ENCOUNTER — RESULTS FOLLOW-UP (OUTPATIENT)
Dept: EMERGENCY MEDICINE | Facility: HOSPITAL | Age: 30
End: 2025-06-18

## 2025-07-14 ENCOUNTER — OFFICE VISIT (OUTPATIENT)
Dept: ORTHOPEDICS | Facility: CLINIC | Age: 30
End: 2025-07-14
Payer: MEDICAID

## 2025-07-14 VITALS
WEIGHT: 175.94 LBS | HEIGHT: 61 IN | DIASTOLIC BLOOD PRESSURE: 71 MMHG | SYSTOLIC BLOOD PRESSURE: 106 MMHG | HEART RATE: 98 BPM | TEMPERATURE: 98 F | BODY MASS INDEX: 33.22 KG/M2

## 2025-07-14 DIAGNOSIS — S93.402D SPRAIN OF LEFT ANKLE, UNSPECIFIED LIGAMENT, SUBSEQUENT ENCOUNTER: Primary | ICD-10-CM

## 2025-07-14 PROCEDURE — 99213 OFFICE O/P EST LOW 20 MIN: CPT | Mod: PBBFAC

## 2025-07-14 NOTE — PROGRESS NOTES
"Subjective:   Patient ID: Susan Banerjee is a 30 y.o. female who presented to [facility] for follow up.    Chief Complaint: Pain of the Left Ankle    History of Present Illness:  Susan Banerjee presents to the clinic for follow up of left sprain after an inversion injury, 5mo ago, DOI: 2/7/25 with a reset injury a few months ago.  She continues to have some left posterior lateral malleolus pain with activity, rates the pain 6/10 today. Quality of pain is described as Dull and Aching. Inciting event: Inversion injury where she stepped on a tree root and a subsequent reset injury around 5/19/25. Pain is aggravated by any weight bearing, standing, and walking. Patient has had no prior ankle problems. Evaluation to date: plain films, MRI, PCP evaluation, ER evaluation, and PT evaluation. Treatment to date: avoidance of activity, bracing, topical analgesics, oral analgesics, PT (completed 3 months of formal PT, with some improvement), home exercises (she admits to not being as discipline when doing her HEP), and ice/heat. Expectation for today's visit:  Follow-up evaluation.  Occupation:  .  PCP: LUBA Posadas.    Ankle/Foot Review of Systems:  Swelling?  no  Instability?  no  Mechanical sx?  no  <30 min AM stiffness? no  Limited ROM? no  Fever/Chills? no    Objective:     Physical Exam:  /71   Pulse 98   Temp 97.6 °F (36.4 °C)   Ht 5' 1" (1.549 m)   Wt 79.8 kg (175 lb 14.8 oz)   LMP 06/03/2025 (Exact Date)   BMI 33.24 kg/m²     Appearance:  Normal gait/station - FWB  Soft tissue swelling: Left: no Right: no  Effusion: Left:  Negative Right: Negative  Erythema: Left no Right: no  Ecchymosis: Left: no Right: no  Atrophy: Left: no Right: no    Palpation:  Ankle/Foot Tenderness: Left:  Posterior lateral malleolus, ATFL   Right: non tender.    Range of motion:  Ankle dorsiflexion (20 degrees):     Left: 20 Right: 20  Ankle " Plantar flexion (50 degrees): Left 50 Right: 50  Subtalar inversion (5 degrees): Left: 5 Right 5  Subtalar eversion (5 degrees):  Left: 5 Right 5  Transverse/midtarsal: adduction (20 degrees): Left: 20 Right: 20  Transverse/midtarsal abduction (10 degrees): Left: 10 Right: 10    Strength:  Foot inversion/dorsiflexion (Deep Peroneal N. L4):Left: 5/5  Pain: yes Right: 5/5  Pain: no  1st toe Dorsiflexion (Deep Peroneal N. L5): Left: 5/5  Pain: no Right: 5/5  Pain: no  Foot plantarflexion (Tibial N. S1): Left: 5/5  Pain: no Right: 5/5  Pain: no  Foot eversion (superficial peroneal L4-S1): Left: 5/5  Pain: yes Right: 5/5  Pain: no    Special Tests:  Sepulveda:  Left: Not performed  Right: Not performed   Anterior drawer: Left: Negative    Right: Negative   Talar tilt: Left: Negative     Right: Negative   External rotation stress (Kleiger's): Left: Negative  Right: Negative  Eversion stress: Left: Negative Right: Negative   Squeeze: Left: Negative  Right: Negative    General appearance: NAD  Peripheral pulses: normal bilaterally   Reflexes: Left: normal Right normal   Sensation: normal    Labs:  Last A1c: The patient doesn't have any registry metric data available     Imaging:   Previous images reviewed.  X-rays ordered and performed today: no  MRI left ankle on 2/12/25, My Interpretation: ATFL and CFL complete tear, partial tear deltoid ligament, PFL is intact.    Assessment:     Encounter Diagnoses   Code Name Primary?    S93.402D Sprain of left ankle, unspecified ligament, subsequent encounter Yes       Plan:     Dx:  Left ankle sprain with ATFL and CFL complete tears, partial deltoid ligament tear, DOI:  2/7/25 with a reset injury around 5/19/25 - appropriately improving  Treatment Plan: Discussed with patient diagnosis, prognosis, and treatment recommendations.  Discussed with the patient continued nonsurgical management lytes area.  She has officially transitioned to an ankle brace when she feels that she is getting  enough support in at this time.  She has completed 3 months of formal PT with improvement, although the patient has not been as compliant with her HEP due to equipment restraints.  Discussed the importance of continued ankle bracing in continued HEP to help prevent future injuries.  Recommend the patient continue in ankle brace with activity over the next 6 months. At this time the patient has shown adequate improvement since her last visit.  Recommend the patient follow up PRN.  Imaging: prior radiological studies independently reviewed; discussed with patient; agree with radiologist interpretation.   Weight Management: is paramount. Recommend at least 10% of total body weight loss if your BMI is 30-34.9. A BMI of <24.9 may provide further relief..   Activity: Activity as tolerated; HEP to include aerobic conditioning and strength training with non-painful activity. ROM/STG exercises. Proper footware; assistive devises to avoid limping.   Therapy: HEP  Medication: CONTINUE over-the-counter acetaminophen (Tylenol 1000 mg three times per day as needed)  CONTINUE Voltaren Gel 1% as prescribed  CONTINUE over-the-counter NSAIDs (ibuprofen 200mg three tablets three times a day as needed). Please see your primary care physician for further refills.  RTC: PRN; call if any issues.      This note is dictated using the M*Modal Fluency Direct word recognition program. There are word recognition mistakes that are occasionally missed on review.     Clemencia Woods MD  Sports Medicine Fellow

## 2025-07-28 ENCOUNTER — HOSPITAL ENCOUNTER (EMERGENCY)
Facility: HOSPITAL | Age: 30
Discharge: HOME | End: 2025-07-28
Attending: EMERGENCY MEDICINE
Payer: MEDICAID

## 2025-07-28 VITALS
DIASTOLIC BLOOD PRESSURE: 84 MMHG | OXYGEN SATURATION: 100 % | HEART RATE: 59 BPM | WEIGHT: 179 LBS | BODY MASS INDEX: 35.14 KG/M2 | HEIGHT: 60 IN | RESPIRATION RATE: 20 BRPM | SYSTOLIC BLOOD PRESSURE: 113 MMHG | TEMPERATURE: 98 F

## 2025-07-28 DIAGNOSIS — R51.9 NONINTRACTABLE HEADACHE, UNSPECIFIED CHRONICITY PATTERN, UNSPECIFIED HEADACHE TYPE: Primary | ICD-10-CM

## 2025-07-28 LAB — B-HCG UR QL: NEGATIVE

## 2025-07-28 PROCEDURE — 96375 TX/PRO/DX INJ NEW DRUG ADDON: CPT

## 2025-07-28 PROCEDURE — 96361 HYDRATE IV INFUSION ADD-ON: CPT

## 2025-07-28 PROCEDURE — 63600175 PHARM REV CODE 636 W HCPCS: Mod: JZ,TB | Performed by: NURSE PRACTITIONER

## 2025-07-28 PROCEDURE — 96374 THER/PROPH/DIAG INJ IV PUSH: CPT

## 2025-07-28 PROCEDURE — 99285 EMERGENCY DEPT VISIT HI MDM: CPT | Mod: 25

## 2025-07-28 PROCEDURE — 25000003 PHARM REV CODE 250: Performed by: NURSE PRACTITIONER

## 2025-07-28 PROCEDURE — 81025 URINE PREGNANCY TEST: CPT | Performed by: NURSE PRACTITIONER

## 2025-07-28 RX ORDER — PROCHLORPERAZINE EDISYLATE 5 MG/ML
5 INJECTION INTRAMUSCULAR; INTRAVENOUS
Status: COMPLETED | OUTPATIENT
Start: 2025-07-28 | End: 2025-07-28

## 2025-07-28 RX ORDER — DIPHENHYDRAMINE HYDROCHLORIDE 50 MG/ML
25 INJECTION, SOLUTION INTRAMUSCULAR; INTRAVENOUS
Status: COMPLETED | OUTPATIENT
Start: 2025-07-28 | End: 2025-07-28

## 2025-07-28 RX ORDER — BUTALBITAL, ACETAMINOPHEN AND CAFFEINE 50; 325; 40 MG/1; MG/1; MG/1
1 TABLET ORAL EVERY 6 HOURS PRN
Qty: 15 TABLET | Refills: 0 | Status: SHIPPED | OUTPATIENT
Start: 2025-07-28

## 2025-07-28 RX ORDER — SODIUM CHLORIDE 9 MG/ML
1000 INJECTION, SOLUTION INTRAVENOUS
Status: COMPLETED | OUTPATIENT
Start: 2025-07-28 | End: 2025-07-28

## 2025-07-28 RX ORDER — KETOROLAC TROMETHAMINE 30 MG/ML
30 INJECTION, SOLUTION INTRAMUSCULAR; INTRAVENOUS
Status: COMPLETED | OUTPATIENT
Start: 2025-07-28 | End: 2025-07-28

## 2025-07-28 RX ADMIN — SODIUM CHLORIDE 1000 ML: 9 INJECTION, SOLUTION INTRAVENOUS at 03:07

## 2025-07-28 RX ADMIN — PROCHLORPERAZINE EDISYLATE 5 MG: 5 INJECTION INTRAMUSCULAR; INTRAVENOUS at 03:07

## 2025-07-28 RX ADMIN — KETOROLAC TROMETHAMINE 30 MG: 30 INJECTION, SOLUTION INTRAMUSCULAR; INTRAVENOUS at 03:07

## 2025-07-28 RX ADMIN — DIPHENHYDRAMINE HYDROCHLORIDE 25 MG: 50 INJECTION INTRAMUSCULAR; INTRAVENOUS at 03:07

## 2025-07-28 NOTE — Clinical Note
"Susan Garciadaryl Banerjee was seen and treated in our emergency department on 7/28/2025.  She may return to work on 07/30/2025.       If you have any questions or concerns, please don't hesitate to call.      Tamiko Aranda, LUBA"

## 2025-07-28 NOTE — ED PROVIDER NOTES
Encounter Date: 7/28/2025       History     Chief Complaint   Patient presents with    Neck Pain     Pt c/o having neck pain that is causing a headache onset three days ago.     Patient states posterior headache x3 days.  States that headache is intermittent.  States no relief with ibuprofen and Flexeril.  Patient states that she does have a history of headaches.  Patient states sensitivity to light.  Denies any nausea, vomiting, fever, or blurred vision.  Patient states a past medical history of asthma, GERD, migraines, appendectomy, cholecystectomy.    The history is provided by the patient.   Neck Pain   This is a recurrent problem. The current episode started several days ago. The problem occurs intermittently. The problem has been unchanged. The pain is associated with nothing. The quality of the pain is described as aching. The pain does not radiate. The pain is at a severity of 10/10. The pain is The same all the time. Associated symptoms include photophobia and headaches. Pertinent negatives include no visual change, no chest pain, no syncope, no numbness, no bowel incontinence, no bladder incontinence, no paresis, no tingling and no weakness. She has tried NSAIDs and muscle relaxants for the symptoms. The treatment provided mild relief.     Review of patient's allergies indicates:  No Known Allergies  Past Medical History:   Diagnosis Date    Asthma     GERD (gastroesophageal reflux disease) 1994    Migraine headache without aura      Past Surgical History:   Procedure Laterality Date    APPENDECTOMY      CHOLECYSTECTOMY  08/05/25    EGD, WITH CLOSED BIOPSY N/A 5/12/2025    Procedure: EGD, WITH CLOSED BIOPSY;  Surgeon: Tracy Giraldo MD;  Location: [facility] ENDOSCOPY;  Service: Gastroenterology;  Laterality: N/A;    KNEE SURGERY      LAPAROSCOPIC CHOLECYSTECTOMY N/A 08/05/2024    Procedure: CHOLECYSTECTOMY, LAPAROSCOPIC;  Surgeon: Young Stubbs Jr., MD;  Location: [facility] OR;  Service: General;   Laterality: N/A;     Family History   Problem Relation Name Age of Onset    Diabetes Maternal Grandfather      Lung cancer Mother Claudette     Alcohol abuse Mother Claudette     Asthma Mother Claudette     Cancer Mother Claudette     COPD Mother Claudette     Depression Mother Claudette     Arthritis Maternal Grandmother Claudette      Social History[1]  Review of Systems   Constitutional: Negative.    HENT: Negative.     Eyes:  Positive for photophobia.   Respiratory: Negative.     Cardiovascular: Negative.  Negative for chest pain and syncope.   Gastrointestinal: Negative.  Negative for bowel incontinence.   Endocrine: Negative.    Genitourinary: Negative.  Negative for bladder incontinence.   Musculoskeletal:  Positive for neck pain.   Skin: Negative.    Allergic/Immunologic: Negative.    Neurological:  Positive for headaches. Negative for dizziness, tingling, speech difficulty, weakness and numbness.   Hematological: Negative.    Psychiatric/Behavioral: Negative.     All other systems reviewed and are negative.      Physical Exam     Initial Vitals [07/28/25 1310]   BP Pulse Resp Temp SpO2   111/63 71 20 98.1 °F (36.7 °C) 97 %      MAP       --         Physical Exam    Nursing note and vitals reviewed.  Constitutional: She appears well-developed and well-nourished. No distress.   HENT:   Head: Normocephalic and atraumatic. Mouth/Throat: Oropharynx is clear and moist.   Eyes: Conjunctivae and EOM are normal. Pupils are equal, round, and reactive to light.   Neck: Neck supple.   Normal range of motion.  Cardiovascular:  Normal rate, regular rhythm, normal heart sounds and intact distal pulses.           Pulmonary/Chest: Breath sounds normal. No respiratory distress. She has no wheezes.   Abdominal: Abdomen is soft. Bowel sounds are normal. She exhibits no distension. There is no abdominal tenderness.   Musculoskeletal:         General: No tenderness or edema. Normal range of motion.      Cervical back: Normal  range of motion and neck supple. No rigidity. No spinous process tenderness or muscular tenderness. Normal range of motion.     Neurological: She is alert and oriented to person, place, and time. She has normal strength. Gait normal. GCS score is 15. GCS eye subscore is 4. GCS verbal subscore is 5. GCS motor subscore is 6.   Skin: Skin is warm and dry. No rash noted.   Psychiatric: She has a normal mood and affect. Thought content normal.         ED Course   Procedures  Labs Reviewed   HCG QUALITATIVE URINE - Normal       Result Value    hCG Qualitative, Urine Negative            Imaging Results              CT Head Without Contrast (Final result)  Result time 07/28/25 16:49:03      Final result by Gian Stallings MD (07/28/25 16:49:03)                   Impression:      No acute intracranial findings.      Electronically signed by: Gian Stallings  Date:    07/28/2025  Time:    16:49               Narrative:    EXAMINATION:  CT HEAD WITHOUT CONTRAST    CLINICAL HISTORY:  Headache, new or worsening, neuro deficit (Age 19-49y);    TECHNIQUE:  CT imaging of the head performed from the skull base to the vertex without intravenous contrast.   mGycm. Automatic exposure control, adjustment of mA/kV or iterative reconstruction technique was used to reduce radiation.    COMPARISON:  18 March 2013    FINDINGS:  Mildly motion degraded scan.  There is no acute cortical infarct, hemorrhage or mass lesion.  The ventricles are normal in size.    Paranasal sinuses and mastoid air cells are grossly clear.                                       Medications   prochlorperazine injection Soln 5 mg (5 mg Intravenous Given 7/28/25 1525)   0.9% NaCl infusion (0 mLs Intravenous Stopped 7/28/25 1624)   diphenhydrAMINE injection 25 mg (25 mg Intravenous Given 7/28/25 1525)   ketorolac injection 30 mg (30 mg Intravenous Given 7/28/25 1525)     Medical Decision Making  Patient states posterior headache x3 days.  States that headache is  intermittent.  States no relief with ibuprofen and Flexeril.  Patient states that she does have a history of headaches.  Patient states sensitivity to light.  Denies any nausea, vomiting, fever, or blurred vision.  Patient states a past medical history of asthma, GERD, migraines, appendectomy, cholecystectomy.    The history is provided by the patient.   Neck Pain   This is a recurrent problem. The current episode started several days ago. The problem occurs intermittently. The problem has been unchanged. The pain is associated with nothing. The quality of the pain is described as aching. The pain does not radiate. The pain is at a severity of 10/10. The pain is The same all the time. Associated symptoms include photophobia and headaches. Pertinent negatives include no visual change, no chest pain, no syncope, no numbness, no bowel incontinence, no bladder incontinence, no paresis, no tingling and no weakness. She has tried NSAIDs and muscle relaxants for the symptoms. The treatment provided mild relief.       Amount and/or Complexity of Data Reviewed  Labs:  Decision-making details documented in ED Course.  Radiology: ordered. Decision-making details documented in ED Course.  Discussion of management or test interpretation with external provider(s): Differential diagnosis (including but not limited to):   Judging by the patient's chief complaint and pertinent history, the patient has the following possible differential diagnoses, including but not limited to the following.  Some of these are deemed to be lower likelihood and some more likely based on my physical exam and history combined with possible lab work and/or imaging studies.   Please see the pertinent studies, and refer to the HPI.  Some of these diagnoses will take further evaluation to fully rule out, perhaps as an outpatient and the patient was encouraged to follow up when discharged for more comprehensive evaluation.  Headache, migraine, tension  headache, cluster headache, sinusitis  Patient's CT scan of her head does not show any acute change.  Patient was given medications for migraine headache in the ED.  Patient states headache is now a 3/10 and pain has been improved.  Patient states that she feels improved and is ready for discharge.  We will send patient a prescription of Fioricet for headache pain.  Instructed patient to follow up with her PCP.  ED return precautions were given.    Risk  Prescription drug management.               ED Course as of 07/28/25 1706   Mon Jul 28, 2025   1658 HCG Qualitative Urine [AB]   1658 hCG Qualitative, Urine: Negative [AB]   1658 CT Head Without Contrast  18 March 2013     FINDINGS:  Mildly motion degraded scan.  There is no acute cortical infarct, hemorrhage or mass lesion.  The ventricles are normal in size.     Paranasal sinuses and mastoid air cells are grossly clear.     Impression:     No acute intracranial findings.   [AB]      ED Course User Index  [AB] Tamiko Aranda FNP                               Clinical Impression:  Final diagnoses:  [R51.9] Nonintractable headache, unspecified chronicity pattern, unspecified headache type (Primary)          ED Disposition Condition    Discharge Stable          ED Prescriptions       Medication Sig Dispense Start Date End Date Auth. Provider    butalbital-acetaminophen-caffeine -40 mg (FIORICET, ESGIC) -40 mg per tablet Take 1 tablet by mouth every 6 (six) hours as needed for Headaches. 15 tablet 7/28/2025 -- Tamiko Aranda FNP          Follow-up Information       Follow up With Specialties Details Why Contact Info    Dory Parsons NP Family Medicine In 3 days  PO   [facility] [address]  [phone]                     [1]   Social History  Tobacco Use    Smoking status: Never     Passive exposure: Never    Smokeless tobacco: Never   Substance Use Topics    Alcohol use: Not Currently    Drug use: Never        Tamiko Aranda FNP  07/28/25  [id number]

## 2025-08-14 ENCOUNTER — OFFICE VISIT (OUTPATIENT)
Dept: GASTROENTEROLOGY | Facility: CLINIC | Age: 30
End: 2025-08-14
Payer: MEDICAID

## 2025-08-14 VITALS
OXYGEN SATURATION: 98 % | HEART RATE: 69 BPM | BODY MASS INDEX: 35.37 KG/M2 | RESPIRATION RATE: 16 BRPM | DIASTOLIC BLOOD PRESSURE: 71 MMHG | TEMPERATURE: 98 F | WEIGHT: 180.19 LBS | HEIGHT: 60 IN | SYSTOLIC BLOOD PRESSURE: 108 MMHG

## 2025-08-14 DIAGNOSIS — K76.0 HEPATIC STEATOSIS: ICD-10-CM

## 2025-08-14 DIAGNOSIS — R11.2 NAUSEA AND VOMITING, UNSPECIFIED VOMITING TYPE: ICD-10-CM

## 2025-08-14 DIAGNOSIS — K21.9 GASTROESOPHAGEAL REFLUX DISEASE, UNSPECIFIED WHETHER ESOPHAGITIS PRESENT: ICD-10-CM

## 2025-08-14 DIAGNOSIS — R10.13 EPIGASTRIC ABDOMINAL PAIN: Primary | ICD-10-CM

## 2025-08-14 PROCEDURE — 1159F MED LIST DOCD IN RCRD: CPT | Mod: CPTII,,, | Performed by: NURSE PRACTITIONER

## 2025-08-14 PROCEDURE — 3074F SYST BP LT 130 MM HG: CPT | Mod: CPTII,,, | Performed by: NURSE PRACTITIONER

## 2025-08-14 PROCEDURE — 3008F BODY MASS INDEX DOCD: CPT | Mod: CPTII,,, | Performed by: NURSE PRACTITIONER

## 2025-08-14 PROCEDURE — 1160F RVW MEDS BY RX/DR IN RCRD: CPT | Mod: CPTII,,, | Performed by: NURSE PRACTITIONER

## 2025-08-14 PROCEDURE — 99214 OFFICE O/P EST MOD 30 MIN: CPT | Mod: S$PBB,,, | Performed by: NURSE PRACTITIONER

## 2025-08-14 PROCEDURE — 99214 OFFICE O/P EST MOD 30 MIN: CPT | Mod: PBBFAC | Performed by: NURSE PRACTITIONER

## 2025-08-14 PROCEDURE — 3078F DIAST BP <80 MM HG: CPT | Mod: CPTII,,, | Performed by: NURSE PRACTITIONER

## 2025-08-14 RX ORDER — AMITRIPTYLINE HYDROCHLORIDE 10 MG/1
10 TABLET, FILM COATED ORAL NIGHTLY
Qty: 30 TABLET | Refills: 5 | Status: SHIPPED | OUTPATIENT
Start: 2025-08-14

## 2025-08-14 RX ORDER — ONDANSETRON 8 MG/1
8 TABLET, ORALLY DISINTEGRATING ORAL EVERY 12 HOURS PRN
Qty: 60 TABLET | Refills: 2 | Status: SHIPPED | OUTPATIENT
Start: 2025-08-14

## 2025-08-14 RX ORDER — FAMOTIDINE 20 MG/1
20 TABLET, FILM COATED ORAL NIGHTLY PRN
Qty: 30 TABLET | Refills: 11 | Status: SHIPPED | OUTPATIENT
Start: 2025-08-14

## 2025-08-14 RX ORDER — FAMOTIDINE 20 MG/1
20 TABLET, FILM COATED ORAL 2 TIMES DAILY
COMMUNITY
Start: 2025-06-11 | End: 2025-08-14 | Stop reason: SDUPTHER

## 2025-08-14 RX ORDER — PANTOPRAZOLE SODIUM 40 MG/1
40 TABLET, DELAYED RELEASE ORAL DAILY
Qty: 30 TABLET | Refills: 11 | Status: SHIPPED | OUTPATIENT
Start: 2025-08-14

## 2025-08-19 ENCOUNTER — HOSPITAL ENCOUNTER (OUTPATIENT)
Dept: RADIOLOGY | Facility: HOSPITAL | Age: 30
Discharge: HOME | End: 2025-08-19
Attending: NURSE PRACTITIONER
Payer: MEDICAID

## 2025-08-19 DIAGNOSIS — K76.0 HEPATIC STEATOSIS: ICD-10-CM

## 2025-08-19 PROCEDURE — 76705 ECHO EXAM OF ABDOMEN: CPT | Mod: TC

## (undated) DEVICE — GLOVE SENSICARE PI GRN 8

## (undated) DEVICE — APPLICATOR STRL COT 2INNR 6IN

## (undated) DEVICE — BLOCK BLOX BITE DENT RIM 54FR

## (undated) DEVICE — TOWEL OR DISP STRL BLUE 4/PK

## (undated) DEVICE — MOUTHPIECE ENDO 60FR

## (undated) DEVICE — TROCAR ENDOPATH XCEL 12X100MM

## (undated) DEVICE — SYR 10CC LUER LOCK

## (undated) DEVICE — KIT LAPAROSCOPY UNIVERSITY

## (undated) DEVICE — NDL HYPO REG 25G X 1 1/2

## (undated) DEVICE — POSITIONER HEEL FOAM CONVOLTD

## (undated) DEVICE — GLOVE SIGNATURE ESSNTL LTX 6

## (undated) DEVICE — APPLICATOR CHLORAPREP ORN 26ML

## (undated) DEVICE — FORCEP ALLIGATOR 2.8MM W/NDL

## (undated) DEVICE — SOL IRRI STRL WATER 1000ML

## (undated) DEVICE — MANIFOLD 4 PORT

## (undated) DEVICE — BAG TISS RETRV MONARCH 10MM

## (undated) DEVICE — CANNULA ENDOPATH XCEL 5X100MM

## (undated) DEVICE — TROCAR ENDOPATH EXCEL DILATING

## (undated) DEVICE — SUT MCRYL PLUS 4-0 PS2 27IN

## (undated) DEVICE — NDL PNEUMO INSUFFLATI 120MM

## (undated) DEVICE — TROCAR ENDOPATH ECEL

## (undated) DEVICE — KIT SURGICAL COLON .25 1.1OZ

## (undated) DEVICE — ADHESIVE DERMABOND ADVANCED

## (undated) DEVICE — TUBING INSUFFLATOR W/ROT CONCT

## (undated) DEVICE — KIT SURGICAL TURNOVER

## (undated) DEVICE — NDL GRANEE OPEN LOOP GRASPER

## (undated) DEVICE — GLOVE SIGNATURE ESSNTL LTX 8

## (undated) DEVICE — SUT VICRYL+ 27 UR-6 VIOL

## (undated) DEVICE — APPLIER CLIP ENDO MED/LG 10MM

## (undated) DEVICE — GLOVE SENSICARE PI GRN 6.5